# Patient Record
Sex: MALE | Race: WHITE | NOT HISPANIC OR LATINO | Employment: OTHER | URBAN - METROPOLITAN AREA
[De-identification: names, ages, dates, MRNs, and addresses within clinical notes are randomized per-mention and may not be internally consistent; named-entity substitution may affect disease eponyms.]

---

## 2023-08-05 PROBLEM — E78.5 HYPERLIPIDEMIA: Status: ACTIVE | Noted: 2023-08-05

## 2023-08-05 PROBLEM — M25.50 ARTHRALGIA: Status: ACTIVE | Noted: 2023-08-05

## 2023-08-05 PROBLEM — M75.01 ADHESIVE CAPSULITIS OF RIGHT SHOULDER: Status: ACTIVE | Noted: 2023-08-05

## 2023-08-05 PROBLEM — R61 NIGHT SWEATS: Status: ACTIVE | Noted: 2023-08-05

## 2023-08-05 PROBLEM — R79.82 ELEVATED C-REACTIVE PROTEIN (CRP): Status: ACTIVE | Noted: 2023-08-05

## 2023-08-05 PROBLEM — R35.0 URINARY FREQUENCY: Status: ACTIVE | Noted: 2023-08-05

## 2023-08-05 PROBLEM — M75.101 ROTATOR CUFF TEAR ARTHROPATHY OF RIGHT SHOULDER: Status: ACTIVE | Noted: 2023-08-05

## 2023-08-05 PROBLEM — K21.9 GERD (GASTROESOPHAGEAL REFLUX DISEASE): Status: ACTIVE | Noted: 2023-08-05

## 2023-08-05 PROBLEM — S43.439A LABRAL TEAR OF SHOULDER: Status: ACTIVE | Noted: 2023-08-05

## 2023-08-05 PROBLEM — U07.1 COVID-19: Status: ACTIVE | Noted: 2023-08-05

## 2023-08-05 PROBLEM — M79.10 MYALGIA: Status: ACTIVE | Noted: 2023-08-05

## 2023-08-05 PROBLEM — J45.909 ALLERGIC ASTHMA (HHS-HCC): Status: ACTIVE | Noted: 2023-08-05

## 2023-08-05 PROBLEM — R70.0 ELEVATED ERYTHROCYTE SEDIMENTATION RATE: Status: ACTIVE | Noted: 2023-08-05

## 2023-08-05 PROBLEM — H90.3 SENSORINEURAL HEARING LOSS, BILATERAL: Status: ACTIVE | Noted: 2023-08-05

## 2023-08-05 PROBLEM — E06.1 THYROIDITIS, SUBACUTE: Status: ACTIVE | Noted: 2023-08-05

## 2023-08-05 PROBLEM — I10 BENIGN ESSENTIAL HYPERTENSION: Status: ACTIVE | Noted: 2023-08-05

## 2023-08-05 PROBLEM — R63.4 ABNORMAL WEIGHT LOSS: Status: ACTIVE | Noted: 2023-08-05

## 2023-08-05 PROBLEM — E03.9 HYPOTHYROIDISM (ACQUIRED): Status: ACTIVE | Noted: 2023-08-05

## 2023-08-05 PROBLEM — E05.90 HYPERTHYROIDISM: Status: ACTIVE | Noted: 2023-08-05

## 2023-08-05 PROBLEM — K44.9 HIATAL HERNIA: Status: ACTIVE | Noted: 2023-08-05

## 2023-08-05 PROBLEM — J30.1 ALLERGIC RHINITIS DUE TO POLLEN: Status: ACTIVE | Noted: 2023-08-05

## 2023-08-05 PROBLEM — H35.30 MACULAR DEGENERATION: Status: ACTIVE | Noted: 2023-08-05

## 2023-08-05 PROBLEM — R05.9 COUGH: Status: ACTIVE | Noted: 2023-08-05

## 2023-08-05 PROBLEM — M25.511 RIGHT SHOULDER PAIN: Status: ACTIVE | Noted: 2023-08-05

## 2023-08-05 PROBLEM — M77.8 CAPSULITIS OF SHOULDER: Status: ACTIVE | Noted: 2023-08-05

## 2023-08-05 PROBLEM — N52.9 ERECTILE DYSFUNCTION: Status: ACTIVE | Noted: 2023-08-05

## 2023-08-05 PROBLEM — J30.81 ALLERGIC RHINITIS DUE TO CATS: Status: ACTIVE | Noted: 2023-08-05

## 2023-08-05 PROBLEM — M12.811 ROTATOR CUFF TEAR ARTHROPATHY OF RIGHT SHOULDER: Status: ACTIVE | Noted: 2023-08-05

## 2023-08-05 RX ORDER — BUDESONIDE AND FORMOTEROL FUMARATE DIHYDRATE 80; 4.5 UG/1; UG/1
2 AEROSOL RESPIRATORY (INHALATION) 2 TIMES DAILY
COMMUNITY
Start: 2022-06-30 | End: 2024-02-12

## 2023-08-05 RX ORDER — MONTELUKAST SODIUM 10 MG/1
1 TABLET ORAL NIGHTLY
COMMUNITY
Start: 2016-03-18 | End: 2024-01-24 | Stop reason: SDUPTHER

## 2023-08-05 RX ORDER — ALBUTEROL SULFATE 90 UG/1
AEROSOL, METERED RESPIRATORY (INHALATION)
COMMUNITY
Start: 2021-08-06 | End: 2024-01-24 | Stop reason: WASHOUT

## 2023-08-07 PROBLEM — M79.10 MYALGIA: Status: RESOLVED | Noted: 2023-08-05 | Resolved: 2023-08-07

## 2023-08-07 PROBLEM — R05.9 COUGH: Status: RESOLVED | Noted: 2023-08-05 | Resolved: 2023-08-07

## 2023-08-07 PROBLEM — R70.0 ELEVATED ERYTHROCYTE SEDIMENTATION RATE: Status: RESOLVED | Noted: 2023-08-05 | Resolved: 2023-08-07

## 2023-08-07 PROBLEM — U07.1 COVID-19: Status: RESOLVED | Noted: 2023-08-05 | Resolved: 2023-08-07

## 2023-08-07 PROBLEM — R79.82 ELEVATED C-REACTIVE PROTEIN (CRP): Status: RESOLVED | Noted: 2023-08-05 | Resolved: 2023-08-07

## 2023-08-07 PROBLEM — R61 NIGHT SWEATS: Status: RESOLVED | Noted: 2023-08-05 | Resolved: 2023-08-07

## 2023-08-07 PROBLEM — M77.8 CAPSULITIS OF SHOULDER: Status: RESOLVED | Noted: 2023-08-05 | Resolved: 2023-08-07

## 2023-08-07 PROBLEM — M25.50 ARTHRALGIA: Status: RESOLVED | Noted: 2023-08-05 | Resolved: 2023-08-07

## 2023-08-07 NOTE — PROGRESS NOTES
Subjective   Patient ID: Bandar Loving is a 58 y.o. male who presents for Annual Exam and Follow-up.    HPI     The patient presents for his annual wellness exam.   Hx of colonoscopy: 3/24/22 polyps   Hx of prostate cancer screening (men 55-69): due   Immunizations: tdap - declines   History of depression or anxiety: no   Diet: Follows an ok diet  Exercise: Not getting regular exercise     HPL- stopped his statin - just didn't want to take it   No side effects   Will restart if needed     HTN- previously on lisinopril, also stopped it just because he didn't want to take a medication at the time, willing to restart   Not checking BP at home     MRI R shoulder showed adhesive capsulitis- saw Dr. Mckee who referred him to PT - completed it with success ; shoulder much improved     Follows with Dr. 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 Deshpande- uses tadalafil prn     Hx subacute thyroiditis, initially hyperthyroid, then hypothyroid, then euthyroid - tx with prednisone- Dr. Gonsalves     No additional concerns today     Review of Systems   Constitutional:  Negative for chills, fatigue and fever.   HENT:  Negative for congestion, ear pain and sore throat.    Eyes:  Negative for visual disturbance.   Respiratory:  Negative for cough and shortness of breath.    Cardiovascular:  Negative for chest pain, palpitations and leg swelling.   Gastrointestinal:  Negative for abdominal pain, constipation, diarrhea, nausea and vomiting.   Genitourinary: Negative.    Musculoskeletal: Negative.    Skin:  Negative for rash.   Neurological:  Negative for dizziness, weakness, numbness and headaches.   Psychiatric/Behavioral: Negative.         Wt Readings from Last 3 Encounters:   08/08/23 82.6 kg (182 lb 3.2 oz)   01/17/23 85.6 kg (188 lb 12.8 oz)   10/31/22 84.1 kg (185 lb 5 oz)       Objective  "  /90 (BP Location: Left arm, Patient Position: Sitting, BP Cuff Size: Large adult)   Pulse 69   Temp 36.6 °C (97.9 °F) (Temporal)   Resp 16   Ht 1.676 m (5' 6\")   Wt 82.6 kg (182 lb 3.2 oz)   SpO2 98%   BMI 29.41 kg/m²     Physical Exam    Constitutional: Well developed, well nourished, alert and in no acute distress.  Head and Face: Normocephalic, atraumatic.  Eyes: Normal external exam. Pupils equally round and reactive to light with normal accommodation and extraocular movements intact.   ENT: External inspection of ears normal, tympanic membranes visualized and normal. Nasal mucosa, septum, and turbinates normal. Oral mucosa moist, oropharynx clear.   Neck: Supple, no lymphadenopathy or masses. Thyroid not enlarged, no palpable nodules.   Cardiovascular: Regular rate and rhythm, normal S1 and S2, no murmurs, gallops, or rubs. Radial pulses normal. No peripheral edema. No carotid bruits.   Pulmonary: No respiratory distress, lungs clear to auscultation bilaterally. No wheezes, rhonchi, rales.  Abdomen: Soft, nontender, nondistended, normal bowel sounds. No masses palpated.   Musculoskeletal: Gait normal. Muscle strength/tone normal. Normal range of motion of all extremities.   Skin: Warm, well perfused, normal skin turgor and color, no lesions or rashes noted.   Neurologic: Cranial nerves II-XII grossly intact. Sensation normal bilaterally.   Psychiatric: Mood calm and affect normal.      Assessment/Plan   Problem List Items Addressed This Visit       Allergic asthma     Continue singulair, albuterol as needed- sees Dr. Jefferson          Benign essential hypertension     Restart lisinopril 10 mg daily   Goal < 130/80  Nurse visit BP check 1-2 weeks          Relevant Medications    lisinopril 10 mg tablet    Other Relevant Orders    Follow Up In [facility] - PCP - Established    Hyperlipidemia     Recheck labs, consider restarting statin          Relevant Orders    Lipid Panel    Adhesive " capsulitis of right shoulder     Much improved with physical therapy          Thyroiditis, subacute     Recheck TSH, previously treated with prednisone- Dr. Gonsalves          Relevant Orders    TSH with reflex to Free T4 if abnormal    Overweight with body mass index (BMI) of 29 to 29.9 in adult     Other Visit Diagnoses       Annual physical exam    -  Primary    Relevant Orders    CBC and Auto Differential    Comprehensive Metabolic Panel    Hemoglobin A1C    Screening for prostate cancer        Relevant Orders    Prostate Spec.Ag,Screen          Carolann Chaney,   8/8/2023

## 2023-08-08 ENCOUNTER — LAB (OUTPATIENT)
Dept: LAB | Facility: LAB | Age: 58
End: 2023-08-08
Payer: COMMERCIAL

## 2023-08-08 ENCOUNTER — OFFICE VISIT (OUTPATIENT)
Dept: PRIMARY CARE | Facility: CLINIC | Age: 58
End: 2023-08-08
Payer: COMMERCIAL

## 2023-08-08 VITALS
RESPIRATION RATE: 16 BRPM | HEART RATE: 69 BPM | OXYGEN SATURATION: 98 % | DIASTOLIC BLOOD PRESSURE: 90 MMHG | SYSTOLIC BLOOD PRESSURE: 135 MMHG | HEIGHT: 66 IN | TEMPERATURE: 97.9 F | WEIGHT: 182.2 LBS | BODY MASS INDEX: 29.28 KG/M2

## 2023-08-08 DIAGNOSIS — M75.01 ADHESIVE CAPSULITIS OF RIGHT SHOULDER: ICD-10-CM

## 2023-08-08 DIAGNOSIS — Z12.5 SCREENING FOR PROSTATE CANCER: ICD-10-CM

## 2023-08-08 DIAGNOSIS — E06.1 THYROIDITIS, SUBACUTE: ICD-10-CM

## 2023-08-08 DIAGNOSIS — I10 BENIGN ESSENTIAL HYPERTENSION: ICD-10-CM

## 2023-08-08 DIAGNOSIS — J45.909 EXTRINSIC ASTHMA WITHOUT COMPLICATION, UNSPECIFIED ASTHMA SEVERITY, UNSPECIFIED WHETHER PERSISTENT (HHS-HCC): ICD-10-CM

## 2023-08-08 DIAGNOSIS — Z00.00 ANNUAL PHYSICAL EXAM: Primary | ICD-10-CM

## 2023-08-08 DIAGNOSIS — E78.5 HYPERLIPIDEMIA, UNSPECIFIED HYPERLIPIDEMIA TYPE: ICD-10-CM

## 2023-08-08 DIAGNOSIS — E66.3 OVERWEIGHT WITH BODY MASS INDEX (BMI) OF 29 TO 29.9 IN ADULT: ICD-10-CM

## 2023-08-08 DIAGNOSIS — Z00.00 ANNUAL PHYSICAL EXAM: ICD-10-CM

## 2023-08-08 PROBLEM — R35.0 URINARY FREQUENCY: Status: RESOLVED | Noted: 2023-08-05 | Resolved: 2023-08-08

## 2023-08-08 PROBLEM — R63.4 ABNORMAL WEIGHT LOSS: Status: RESOLVED | Noted: 2023-08-05 | Resolved: 2023-08-08

## 2023-08-08 PROCEDURE — 3075F SYST BP GE 130 - 139MM HG: CPT | Performed by: FAMILY MEDICINE

## 2023-08-08 PROCEDURE — 36415 COLL VENOUS BLD VENIPUNCTURE: CPT

## 2023-08-08 PROCEDURE — 80053 COMPREHEN METABOLIC PANEL: CPT

## 2023-08-08 PROCEDURE — 84443 ASSAY THYROID STIM HORMONE: CPT

## 2023-08-08 PROCEDURE — 3080F DIAST BP >= 90 MM HG: CPT | Performed by: FAMILY MEDICINE

## 2023-08-08 PROCEDURE — 83036 HEMOGLOBIN GLYCOSYLATED A1C: CPT

## 2023-08-08 PROCEDURE — 80061 LIPID PANEL: CPT

## 2023-08-08 PROCEDURE — 3008F BODY MASS INDEX DOCD: CPT | Performed by: FAMILY MEDICINE

## 2023-08-08 PROCEDURE — 1036F TOBACCO NON-USER: CPT | Performed by: FAMILY MEDICINE

## 2023-08-08 PROCEDURE — 84153 ASSAY OF PSA TOTAL: CPT

## 2023-08-08 PROCEDURE — 85025 COMPLETE CBC W/AUTO DIFF WBC: CPT

## 2023-08-08 PROCEDURE — 99396 PREV VISIT EST AGE 40-64: CPT | Performed by: FAMILY MEDICINE

## 2023-08-08 RX ORDER — LISINOPRIL 10 MG/1
10 TABLET ORAL DAILY
Qty: 90 TABLET | Refills: 3 | Status: SHIPPED | OUTPATIENT
Start: 2023-08-08 | End: 2024-01-24 | Stop reason: WASHOUT

## 2023-08-08 ASSESSMENT — ENCOUNTER SYMPTOMS
SORE THROAT: 0
PALPITATIONS: 0
MUSCULOSKELETAL NEGATIVE: 1
DIZZINESS: 0
HEADACHES: 0
SHORTNESS OF BREATH: 0
DIARRHEA: 0
NUMBNESS: 0
FEVER: 0
FATIGUE: 0
ABDOMINAL PAIN: 0
PSYCHIATRIC NEGATIVE: 1
VOMITING: 0
NAUSEA: 0
COUGH: 0
CHILLS: 0
WEAKNESS: 0
CONSTIPATION: 0

## 2023-08-08 ASSESSMENT — PATIENT HEALTH QUESTIONNAIRE - PHQ9
2. FEELING DOWN, DEPRESSED OR HOPELESS: NOT AT ALL
SUM OF ALL RESPONSES TO PHQ9 QUESTIONS 1 AND 2: 0
1. LITTLE INTEREST OR PLEASURE IN DOING THINGS: NOT AT ALL

## 2023-08-08 NOTE — PATIENT INSTRUCTIONS
[facility]- [place]     Hypertension (High Blood Pressure)-  Start lisinopril 10 mg daily   It is important to control Blood Pressure for reducing risk of stroke, heart attack, kidney damage, and circulatory problems from clogged arteries.  Advised new blood pressure goals based on evidence from recent studies showed blood pressure should be less than 130/80.   Check your BP at least weekly and If your BP is above this goal on repeated measurements, please contact us so we can make treatment adjustments.  Diet recommendations - reduce sodium intake (less than 2,400 mg/day), follow DASH diet, increase exercise (150 minutes per week), lose weight (Goal BMI < 25).   Generally recommend follow up at least every 6 months.    Recommendations for men annual wellness exam:   Colonoscopy at age 45 or earlier if positive family history of colon cancer   Discuss prostate cancer screening between ages 55-69 or sooner if family history of prostate cancer   One time screen for abdominal aortic aneurysm for men ages 65-75 who have ever smoked  Screening for lung cancer with low-dose CT in all adults age 50-80 who have a 20 pack-year smoking history and currently smoke or have quit within the past 15 years  Follow a healthy diet (Dash diet, Mediterranean diet)  Exercise 150 min/wk  Maintain healthy weight (BMI < 25)  Do not smoke   Alcohol in moderation (up to 2 drinks/day)   Get enough sleep (7-8 hours/night)  Make sure immunizations are up to date (influenza, Tdap, shingles if age > 50)  Visit dentist twice yearly      Hyperlipidemia-   You have high cholesterol. (hyperlipidemia). This increases your risk for cardiovascular disease.(Heart attack/stroke).  It is recommended that the patient limit refined carbohydrates such as breads, cereals, alcohol, pasta, pretzels, and sugar- sweetened foods and beverages.  Eat lots of vegetables, lean proteins such as eggs, fish, chicken, and fruit.   Eat small amounts of healthy  fat each day -  a handful of walnuts, an avocado, olive oil on your salad, and fatty fishes such as salmon. Exercise should be included as you are able, with a goal of 30 minutes 4-6 days per week    Continue any prescribed and advised OTC medications, in addition, as reminder:  *For high LDL (>130) use blueberries 1 cup daily  *Psyllium capsules or powder 2 times daily, such as Airec or Metamucil.   *Consider also adding plant stanols and sterols such as Nature Made CholestOff, available over the counter.     *For high triglycerides- start fish oil 2,000 - 4,000 mg daily.

## 2023-08-09 LAB
ALANINE AMINOTRANSFERASE (SGPT) (U/L) IN SER/PLAS: 19 U/L (ref 10–52)
ALBUMIN (G/DL) IN SER/PLAS: 4.9 G/DL (ref 3.4–5)
ALKALINE PHOSPHATASE (U/L) IN SER/PLAS: 39 U/L (ref 33–136)
ANION GAP IN SER/PLAS: 12 MMOL/L (ref 10–20)
ASPARTATE AMINOTRANSFERASE (SGOT) (U/L) IN SER/PLAS: 19 U/L (ref 9–39)
BASOPHILS (10*3/UL) IN BLOOD BY AUTOMATED COUNT: 0.05 X10E9/L (ref 0–0.1)
BASOPHILS/100 LEUKOCYTES IN BLOOD BY AUTOMATED COUNT: 1 % (ref 0–2)
BILIRUBIN TOTAL (MG/DL) IN SER/PLAS: 1 MG/DL (ref 0–1.2)
CALCIUM (MG/DL) IN SER/PLAS: 9.8 MG/DL (ref 8.6–10.6)
CARBON DIOXIDE, TOTAL (MMOL/L) IN SER/PLAS: 30 MMOL/L (ref 21–32)
CHLORIDE (MMOL/L) IN SER/PLAS: 102 MMOL/L (ref 98–107)
CHOLESTEROL (MG/DL) IN SER/PLAS: 232 MG/DL (ref 0–199)
CHOLESTEROL IN HDL (MG/DL) IN SER/PLAS: 62.2 MG/DL
CHOLESTEROL/HDL RATIO: 3.7
CREATININE (MG/DL) IN SER/PLAS: 1.23 MG/DL (ref 0.5–1.3)
EOSINOPHILS (10*3/UL) IN BLOOD BY AUTOMATED COUNT: 0.31 X10E9/L (ref 0–0.7)
EOSINOPHILS/100 LEUKOCYTES IN BLOOD BY AUTOMATED COUNT: 6.5 % (ref 0–6)
ERYTHROCYTE DISTRIBUTION WIDTH (RATIO) BY AUTOMATED COUNT: 12.2 % (ref 11.5–14.5)
ERYTHROCYTE MEAN CORPUSCULAR HEMOGLOBIN CONCENTRATION (G/DL) BY AUTOMATED: 32.6 G/DL (ref 32–36)
ERYTHROCYTE MEAN CORPUSCULAR VOLUME (FL) BY AUTOMATED COUNT: 95 FL (ref 80–100)
ERYTHROCYTES (10*6/UL) IN BLOOD BY AUTOMATED COUNT: 5.07 X10E12/L (ref 4.5–5.9)
ESTIMATED AVERAGE GLUCOSE FOR HBA1C: 91 MG/DL
GFR MALE: 67 ML/MIN/1.73M2
GLUCOSE (MG/DL) IN SER/PLAS: 93 MG/DL (ref 74–99)
HEMATOCRIT (%) IN BLOOD BY AUTOMATED COUNT: 48.1 % (ref 41–52)
HEMOGLOBIN (G/DL) IN BLOOD: 15.7 G/DL (ref 13.5–17.5)
HEMOGLOBIN A1C/HEMOGLOBIN TOTAL IN BLOOD: 4.8 %
IMMATURE GRANULOCYTES/100 LEUKOCYTES IN BLOOD BY AUTOMATED COUNT: 0.2 % (ref 0–0.9)
LDL: 146 MG/DL (ref 0–99)
LEUKOCYTES (10*3/UL) IN BLOOD BY AUTOMATED COUNT: 4.8 X10E9/L (ref 4.4–11.3)
LYMPHOCYTES (10*3/UL) IN BLOOD BY AUTOMATED COUNT: 1.3 X10E9/L (ref 1.2–4.8)
LYMPHOCYTES/100 LEUKOCYTES IN BLOOD BY AUTOMATED COUNT: 27.1 % (ref 13–44)
MONOCYTES (10*3/UL) IN BLOOD BY AUTOMATED COUNT: 0.5 X10E9/L (ref 0.1–1)
MONOCYTES/100 LEUKOCYTES IN BLOOD BY AUTOMATED COUNT: 10.4 % (ref 2–10)
NEUTROPHILS (10*3/UL) IN BLOOD BY AUTOMATED COUNT: 2.63 X10E9/L (ref 1.2–7.7)
NEUTROPHILS/100 LEUKOCYTES IN BLOOD BY AUTOMATED COUNT: 54.8 % (ref 40–80)
NRBC (PER 100 WBCS) BY AUTOMATED COUNT: 0 /100 WBC (ref 0–0)
PLATELETS (10*3/UL) IN BLOOD AUTOMATED COUNT: 172 X10E9/L (ref 150–450)
POTASSIUM (MMOL/L) IN SER/PLAS: 4.2 MMOL/L (ref 3.5–5.3)
PROSTATE SPECIFIC ANTIGEN,SCREEN: 0.34 NG/ML (ref 0–4)
PROTEIN TOTAL: 7.3 G/DL (ref 6.4–8.2)
SODIUM (MMOL/L) IN SER/PLAS: 140 MMOL/L (ref 136–145)
THYROTROPIN (MIU/L) IN SER/PLAS BY DETECTION LIMIT <= 0.05 MIU/L: 2.43 MIU/L (ref 0.44–3.98)
TRIGLYCERIDE (MG/DL) IN SER/PLAS: 117 MG/DL (ref 0–149)
UREA NITROGEN (MG/DL) IN SER/PLAS: 12 MG/DL (ref 6–23)
VLDL: 23 MG/DL (ref 0–40)

## 2023-08-24 ENCOUNTER — CLINICAL SUPPORT (OUTPATIENT)
Dept: PRIMARY CARE | Facility: CLINIC | Age: 58
End: 2023-08-24
Payer: COMMERCIAL

## 2023-08-24 VITALS — HEART RATE: 75 BPM | SYSTOLIC BLOOD PRESSURE: 130 MMHG | DIASTOLIC BLOOD PRESSURE: 79 MMHG

## 2023-08-24 DIAGNOSIS — I10 BENIGN ESSENTIAL HYPERTENSION: ICD-10-CM

## 2023-09-11 LAB
CHLAMYDIA TRACH., AMPLIFIED: NEGATIVE
N. GONORRHEA, AMPLIFIED: NEGATIVE
TRICHOMONAS VAGINALIS: NEGATIVE

## 2023-11-09 PROBLEM — H35.711 CENTRAL SEROUS CHORIORETINOPATHY OF RIGHT EYE: Status: ACTIVE | Noted: 2023-11-09

## 2024-01-24 ENCOUNTER — OFFICE VISIT (OUTPATIENT)
Dept: ALLERGY | Facility: CLINIC | Age: 59
End: 2024-01-24
Payer: COMMERCIAL

## 2024-01-24 VITALS — WEIGHT: 180 LBS | OXYGEN SATURATION: 95 % | HEART RATE: 70 BPM | BODY MASS INDEX: 29.05 KG/M2

## 2024-01-24 DIAGNOSIS — J45.909 EXTRINSIC ASTHMA WITHOUT COMPLICATION, UNSPECIFIED ASTHMA SEVERITY, UNSPECIFIED WHETHER PERSISTENT (HHS-HCC): Primary | ICD-10-CM

## 2024-01-24 DIAGNOSIS — R21 RASH: ICD-10-CM

## 2024-01-24 DIAGNOSIS — J30.81 ALLERGIC RHINITIS DUE TO CATS: ICD-10-CM

## 2024-01-24 LAB
FEV1/FVC: 98 %
FEV1: 79 LITERS
FVC: 80 LITERS

## 2024-01-24 PROCEDURE — 96160 PT-FOCUSED HLTH RISK ASSMT: CPT | Performed by: ALLERGY & IMMUNOLOGY

## 2024-01-24 PROCEDURE — 1036F TOBACCO NON-USER: CPT | Performed by: ALLERGY & IMMUNOLOGY

## 2024-01-24 PROCEDURE — 3008F BODY MASS INDEX DOCD: CPT | Performed by: ALLERGY & IMMUNOLOGY

## 2024-01-24 PROCEDURE — 99214 OFFICE O/P EST MOD 30 MIN: CPT | Performed by: ALLERGY & IMMUNOLOGY

## 2024-01-24 PROCEDURE — 94060 EVALUATION OF WHEEZING: CPT | Performed by: ALLERGY & IMMUNOLOGY

## 2024-01-24 RX ORDER — TRIAMCINOLONE ACETONIDE 1 MG/G
CREAM TOPICAL
COMMUNITY
Start: 2016-09-22 | End: 2024-01-24 | Stop reason: SDUPTHER

## 2024-01-24 RX ORDER — MONTELUKAST SODIUM 10 MG/1
10 TABLET ORAL NIGHTLY
Qty: 90 TABLET | Refills: 3 | Status: SHIPPED | OUTPATIENT
Start: 2024-01-24 | End: 2025-01-23

## 2024-01-24 RX ORDER — TRIAMCINOLONE ACETONIDE 1 MG/G
CREAM TOPICAL 2 TIMES DAILY PRN
Qty: 80 G | Refills: 0 | Status: SHIPPED | OUTPATIENT
Start: 2024-01-24 | End: 2024-02-12

## 2024-01-24 RX ORDER — KETOCONAZOLE 20 MG/G
CREAM TOPICAL DAILY
COMMUNITY
Start: 2019-04-10

## 2024-01-24 NOTE — PATIENT INSTRUCTIONS
Continue montelukast every night.    Continue Symbicort 2 inhalations every 4 hours as needed.    Apply triamcinolone cream to affected areas as needed.

## 2024-01-24 NOTE — PROGRESS NOTES
Subjective   Patient ID:   [id number]   Bandar Loving is a 59 y.o. male who presents for Asthma.    Chief Complaint   Patient presents with    Asthma      Since last visit, 08-, patient reports he is having his usual wintertime asthma flare.  He continues montelukast intermittently, sometimes 4 in a week, sometimes none.  He does not feel he needs it every day other than during winter.  He does not use his albuterol at all.  He carries his Symbicort and uses it about 3 times a week, 2 inhalations.  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    Patient also complains of intermittent breakouts and requests a refill of his triamcinolone cream.      Objective     Pulse 70   Wt 81.6 kg (180 lb)   SpO2 95%   BMI 29.05 kg/m²      Physical Exam  Constitutional:       Appearance: Normal appearance.   HENT:      Head: Normocephalic and atraumatic.      Right Ear: External ear normal. There is no impacted cerumen.      Left Ear: External ear normal. There is no impacted cerumen.      Nose: No congestion or rhinorrhea.   Eyes:      Extraocular Movements: Extraocular movements intact.      Conjunctiva/sclera: Conjunctivae normal.      Pupils: Pupils are equal, round, and reactive to light.   Cardiovascular:      Rate and Rhythm: Normal rate and regular rhythm.      Heart sounds: No murmur heard.     No friction rub. No gallop.   Pulmonary:      Effort: No respiratory distress.      Breath sounds: No wheezing, rhonchi or rales.   Skin:     General: Skin is warm and dry.   Neurological:      Mental Status: He is alert.   Psychiatric:         Mood and Affect: Mood normal.         Behavior: Behavior normal.       Pulmonary Functions Testing Results:    FEV1   Date Value Ref Range Status   01/24/2024 79 liters Final     FVC   Date Value Ref Range Status   01/24/2024 80 liters Final     FEV1/FVC   Date Value Ref Range Status   01/24/2024 98 % Final       Assessment/Plan          Allergic asthma  ACT is 18.  IO pre and post PFT showed increased reversibility indicating presence of inflammation.  Lung age went from 76 y.o. to 63yo.    I would like him to start montelukast every night during the winter months. He tends to have exacerbations in the winter due to increased exposure.     Continue Symbicort 2 inhalations every 4 hours as needed.         Allergic rhinitis due to cats  He will continue triamcinolone as needed      By signing my name below, I, Jenny Saravia, attest that this documentation has been prepared under the direction and in the presence of Nicky Jefferson MD.  All medical record entries made by the Scribe were at my direction and personally dictated by me. I have reviewed the chart and agree that the record accurately reflects my personal performance of the history, physical exam, discussion and plan.

## 2024-01-24 NOTE — ASSESSMENT & PLAN NOTE
ACT is 18.  IO pre and post PFT showed increased reversibility indicating presence of inflammation.  Lung age went from 76 y.o. to 63yo.    I would like him to start montelukast every night during the winter months. He tends to have exacerbations in the winter due to increased exposure.     Continue Symbicort 2 inhalations every 4 hours as needed.

## 2024-02-08 ENCOUNTER — OFFICE VISIT (OUTPATIENT)
Dept: PRIMARY CARE | Facility: CLINIC | Age: 59
End: 2024-02-08
Payer: COMMERCIAL

## 2024-02-08 VITALS
DIASTOLIC BLOOD PRESSURE: 82 MMHG | TEMPERATURE: 97.8 F | SYSTOLIC BLOOD PRESSURE: 134 MMHG | RESPIRATION RATE: 16 BRPM | OXYGEN SATURATION: 97 % | WEIGHT: 185.7 LBS | HEART RATE: 66 BPM | BODY MASS INDEX: 29.97 KG/M2

## 2024-02-08 DIAGNOSIS — E06.1 THYROIDITIS, SUBACUTE: ICD-10-CM

## 2024-02-08 DIAGNOSIS — I10 BENIGN ESSENTIAL HYPERTENSION: ICD-10-CM

## 2024-02-08 DIAGNOSIS — E78.5 HYPERLIPIDEMIA, UNSPECIFIED HYPERLIPIDEMIA TYPE: Primary | ICD-10-CM

## 2024-02-08 DIAGNOSIS — R93.1 AGATSTON CORONARY ARTERY CALCIUM SCORE BETWEEN 100 AND 400: ICD-10-CM

## 2024-02-08 DIAGNOSIS — E66.3 OVERWEIGHT WITH BODY MASS INDEX (BMI) OF 29 TO 29.9 IN ADULT: ICD-10-CM

## 2024-02-08 DIAGNOSIS — Z13.6 ENCOUNTER FOR SCREENING FOR CARDIOVASCULAR DISORDERS: ICD-10-CM

## 2024-02-08 PROCEDURE — 3075F SYST BP GE 130 - 139MM HG: CPT | Performed by: FAMILY MEDICINE

## 2024-02-08 PROCEDURE — 3008F BODY MASS INDEX DOCD: CPT | Performed by: FAMILY MEDICINE

## 2024-02-08 PROCEDURE — 3079F DIAST BP 80-89 MM HG: CPT | Performed by: FAMILY MEDICINE

## 2024-02-08 PROCEDURE — 99214 OFFICE O/P EST MOD 30 MIN: CPT | Performed by: FAMILY MEDICINE

## 2024-02-08 PROCEDURE — 1036F TOBACCO NON-USER: CPT | Performed by: FAMILY MEDICINE

## 2024-02-08 RX ORDER — ASPIRIN 81 MG/1
81 TABLET ORAL DAILY
COMMUNITY

## 2024-02-08 RX ORDER — ROSUVASTATIN CALCIUM 10 MG/1
10 TABLET, COATED ORAL DAILY
Qty: 90 TABLET | Refills: 3 | Status: SHIPPED | OUTPATIENT
Start: 2024-02-08 | End: 2025-02-07

## 2024-02-08 RX ORDER — LISINOPRIL 10 MG/1
10 TABLET ORAL DAILY
Qty: 90 TABLET | Refills: 3 | Status: SHIPPED | OUTPATIENT
Start: 2024-02-08 | End: 2025-02-07

## 2024-02-08 RX ORDER — ATENOLOL 50 MG/1
50 TABLET ORAL
COMMUNITY
Start: 2022-02-28 | End: 2024-02-08 | Stop reason: ALTCHOICE

## 2024-02-08 ASSESSMENT — ENCOUNTER SYMPTOMS
SHORTNESS OF BREATH: 0
FATIGUE: 0
CHILLS: 0
FEVER: 0
COUGH: 0
PALPITATIONS: 0

## 2024-02-08 NOTE — PROGRESS NOTES
Subjective   Patient ID: Bandar Loving is a 59 y.o. male who presents for Follow-up, Hypertension, Hypothyroidism, and Hyperlipidemia.    HPI     Here for follow up-       HPL- stopped his statin - just didn't want to take it   No side effects   Will restart if needed   CT calcium score 163 in 2019   Nuclear stress neg 2022   He denies chest pain, palpitations, dyspnea      HTN- he hasn't been taking his BP medication consistently, restarted it about a week ago  Does not check BP at home      Follows with Dr. 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 Deshpande- uses tadalafil prn      Hx subacute thyroiditis, initially hyperthyroid, then hypothyroid, then euthyroid - tx with prednisone- Dr. Gonsalves - not on meds now        Current Outpatient Medications   Medication Sig Dispense Refill    ketoconazole (NIZOral) 2 % cream Apply topically once daily.      montelukast (Singulair) 10 mg tablet Take 1 tablet (10 mg) by mouth once daily at bedtime. 90 tablet 3    Symbicort 80-4.5 mcg/actuation inhaler Inhale 2 puffs 2 times a day.      triamcinolone (Kenalog) 0.1 % cream Apply topically 2 times a day as needed for rash or irritation. 80 g 0    aspirin 81 mg EC tablet Take 1 tablet (81 mg) by mouth once daily.      lisinopril 10 mg tablet Take 1 tablet (10 mg) by mouth once daily. 90 tablet 3    rosuvastatin (Crestor) 10 mg tablet Take 1 tablet (10 mg) by mouth once daily. 90 tablet 3     No current facility-administered medications for this visit.       Review of Systems   Constitutional:  Negative for chills, fatigue and fever.   Respiratory:  Negative for cough and shortness of breath.    Cardiovascular:  Negative for chest pain and palpitations.         Objective   /82   Pulse 66   Temp 36.6 °C (97.8 °F) (Temporal)   Resp 16   Wt 84.2 kg (185 lb 11.2 oz)   SpO2 97%   BMI 29.97 kg/m²     Physical  Exam    Constitutional: Well developed, well nourished, alert and in no acute distress   Eyes: Normal external exam. Pupils equally round and reactive to light with normal accommodation and extraocular movements intact.  Neck: Supple, no lymphadenopathy or masses.   Cardiovascular: Regular rate and rhythm, normal S1 and S2, no murmurs, gallops, or rubs. Radial pulses normal. No peripheral edema.  Pulmonary: No respiratory distress, lungs clear to auscultation bilaterally. No wheezes, rhonchi, rales.  Skin: Warm, well perfused, normal skin turgor and color.   Neurologic: Cranial nerves II-XII grossly intact.   Psychiatric: Mood calm and affect normal.      Assessment/Plan   Problem List Items Addressed This Visit             ICD-10-CM    Benign essential hypertension I10     Restart lisinopril 10 mg daily   Goal < 130/80  Nurse visit BP check 1-2 weeks          Relevant Medications    lisinopril 10 mg tablet    Other Relevant Orders    Follow Up In [facility] - PCP - Established    Hyperlipidemia - Primary E78.5     Restart statin   Check labs 3 months          Relevant Medications    rosuvastatin (Crestor) 10 mg tablet    Other Relevant Orders    Lipid Panel    Comprehensive Metabolic Panel    Thyroiditis, subacute E06.1     History of, previously treated with prednisone- Dr. Gonsalves           Overweight with body mass index (BMI) of 29 to 29.9 in adult E66.3, Z68.29    Agatston coronary artery calcium score between 100 and 400 R93.1     Start aspirin 81 mg daily  Start statin  Repeat CT calcium score summer 2024   Follow up with cardiology          Relevant Orders    Referral to Cardiology     Other Visit Diagnoses         Codes    Encounter for screening for cardiovascular disorders     Z13.6    Relevant Orders    CT cardiac scoring wo IV contrast          Follow up 6 months   Carolann Chaney DO  2/8/2024

## 2024-02-08 NOTE — ASSESSMENT & PLAN NOTE
Start aspirin 81 mg daily  Start statin  Repeat CT calcium score summer 2024   Follow up with cardiology

## 2024-02-11 DIAGNOSIS — R21 RASH: ICD-10-CM

## 2024-02-12 RX ORDER — TRIAMCINOLONE ACETONIDE 1 MG/G
CREAM TOPICAL
Qty: 80 G | Refills: 1 | Status: SHIPPED | OUTPATIENT
Start: 2024-02-12

## 2024-02-12 RX ORDER — BUDESONIDE AND FORMOTEROL FUMARATE DIHYDRATE 80; 4.5 UG/1; UG/1
2 AEROSOL RESPIRATORY (INHALATION) 2 TIMES DAILY
Qty: 30.6 G | Refills: 3 | Status: SHIPPED | OUTPATIENT
Start: 2024-02-12

## 2024-02-22 PROBLEM — M17.9 OSTEOARTHRITIS OF KNEE: Status: ACTIVE | Noted: 2024-02-22

## 2024-02-22 PROBLEM — R06.02 SHORTNESS OF BREATH: Status: ACTIVE | Noted: 2024-02-22

## 2024-02-22 PROBLEM — B35.9 DERMATOPHYTOSIS: Status: ACTIVE | Noted: 2024-02-22

## 2024-02-22 PROBLEM — J32.9 SINUSITIS: Status: ACTIVE | Noted: 2024-02-22

## 2024-02-22 PROBLEM — R07.9 CHEST PAIN: Status: ACTIVE | Noted: 2024-02-22

## 2024-02-22 PROBLEM — R53.81 MALAISE AND FATIGUE: Status: ACTIVE | Noted: 2024-02-22

## 2024-02-22 PROBLEM — R91.8 LUNG MASS: Status: ACTIVE | Noted: 2024-02-22

## 2024-02-22 PROBLEM — R53.83 MALAISE AND FATIGUE: Status: ACTIVE | Noted: 2024-02-22

## 2024-02-22 PROBLEM — J02.9 PHARYNGITIS: Status: ACTIVE | Noted: 2024-02-22

## 2024-02-22 PROBLEM — D18.01 HEMANGIOMA OF SKIN AND SUBCUTANEOUS TISSUE: Status: ACTIVE | Noted: 2019-04-09

## 2024-02-22 PROBLEM — D50.9 IRON DEFICIENCY ANEMIA: Status: ACTIVE | Noted: 2024-02-22

## 2024-02-22 PROBLEM — R10.9 ABDOMINAL PAIN: Status: ACTIVE | Noted: 2024-02-22

## 2024-02-27 ENCOUNTER — OFFICE VISIT (OUTPATIENT)
Dept: CARDIOLOGY | Facility: CLINIC | Age: 59
End: 2024-02-27
Payer: COMMERCIAL

## 2024-02-27 ENCOUNTER — LAB (OUTPATIENT)
Dept: LAB | Facility: LAB | Age: 59
End: 2024-02-27
Payer: COMMERCIAL

## 2024-02-27 VITALS
HEIGHT: 66 IN | HEART RATE: 71 BPM | WEIGHT: 181 LBS | DIASTOLIC BLOOD PRESSURE: 92 MMHG | BODY MASS INDEX: 29.09 KG/M2 | OXYGEN SATURATION: 96 % | SYSTOLIC BLOOD PRESSURE: 131 MMHG

## 2024-02-27 DIAGNOSIS — R93.1 AGATSTON CORONARY ARTERY CALCIUM SCORE BETWEEN 100 AND 400: Primary | ICD-10-CM

## 2024-02-27 DIAGNOSIS — E78.5 HYPERLIPIDEMIA, UNSPECIFIED HYPERLIPIDEMIA TYPE: ICD-10-CM

## 2024-02-27 DIAGNOSIS — I10 BENIGN ESSENTIAL HYPERTENSION: ICD-10-CM

## 2024-02-27 LAB
ALBUMIN SERPL BCP-MCNC: 4.9 G/DL (ref 3.4–5)
ALP SERPL-CCNC: 44 U/L (ref 33–136)
ALT SERPL W P-5'-P-CCNC: 27 U/L (ref 10–52)
ANION GAP SERPL CALC-SCNC: 14 MMOL/L (ref 10–20)
AST SERPL W P-5'-P-CCNC: 22 U/L (ref 9–39)
BILIRUB SERPL-MCNC: 0.8 MG/DL (ref 0–1.2)
BUN SERPL-MCNC: 16 MG/DL (ref 6–23)
CALCIUM SERPL-MCNC: 9.9 MG/DL (ref 8.6–10.6)
CHLORIDE SERPL-SCNC: 103 MMOL/L (ref 98–107)
CHOLEST SERPL-MCNC: 188 MG/DL (ref 0–199)
CHOLESTEROL/HDL RATIO: 3.1
CO2 SERPL-SCNC: 29 MMOL/L (ref 21–32)
CREAT SERPL-MCNC: 1.29 MG/DL (ref 0.5–1.3)
EGFRCR SERPLBLD CKD-EPI 2021: 63 ML/MIN/1.73M*2
GLUCOSE SERPL-MCNC: 87 MG/DL (ref 74–99)
HDLC SERPL-MCNC: 60.3 MG/DL
LDLC SERPL CALC-MCNC: 112 MG/DL
NON HDL CHOLESTEROL: 128 MG/DL (ref 0–149)
POTASSIUM SERPL-SCNC: 4.5 MMOL/L (ref 3.5–5.3)
PROT SERPL-MCNC: 7.6 G/DL (ref 6.4–8.2)
SODIUM SERPL-SCNC: 141 MMOL/L (ref 136–145)
TRIGL SERPL-MCNC: 79 MG/DL (ref 0–149)
VLDL: 16 MG/DL (ref 0–40)

## 2024-02-27 PROCEDURE — 3080F DIAST BP >= 90 MM HG: CPT | Performed by: STUDENT IN AN ORGANIZED HEALTH CARE EDUCATION/TRAINING PROGRAM

## 2024-02-27 PROCEDURE — 99203 OFFICE O/P NEW LOW 30 MIN: CPT | Performed by: STUDENT IN AN ORGANIZED HEALTH CARE EDUCATION/TRAINING PROGRAM

## 2024-02-27 PROCEDURE — 99213 OFFICE O/P EST LOW 20 MIN: CPT | Mod: 25 | Performed by: STUDENT IN AN ORGANIZED HEALTH CARE EDUCATION/TRAINING PROGRAM

## 2024-02-27 PROCEDURE — 3008F BODY MASS INDEX DOCD: CPT | Performed by: STUDENT IN AN ORGANIZED HEALTH CARE EDUCATION/TRAINING PROGRAM

## 2024-02-27 PROCEDURE — 3075F SYST BP GE 130 - 139MM HG: CPT | Performed by: STUDENT IN AN ORGANIZED HEALTH CARE EDUCATION/TRAINING PROGRAM

## 2024-02-27 PROCEDURE — 93005 ELECTROCARDIOGRAM TRACING: CPT | Performed by: STUDENT IN AN ORGANIZED HEALTH CARE EDUCATION/TRAINING PROGRAM

## 2024-02-27 PROCEDURE — 1036F TOBACCO NON-USER: CPT | Performed by: STUDENT IN AN ORGANIZED HEALTH CARE EDUCATION/TRAINING PROGRAM

## 2024-02-27 PROCEDURE — 93010 ELECTROCARDIOGRAM REPORT: CPT | Performed by: STUDENT IN AN ORGANIZED HEALTH CARE EDUCATION/TRAINING PROGRAM

## 2024-02-27 PROCEDURE — 36415 COLL VENOUS BLD VENIPUNCTURE: CPT

## 2024-02-27 PROCEDURE — 80061 LIPID PANEL: CPT

## 2024-02-27 PROCEDURE — 80053 COMPREHEN METABOLIC PANEL: CPT

## 2024-02-27 NOTE — PATIENT INSTRUCTIONS
Follow-up in 1 year  Continue current medication regimen as prescribed  Continue heart healthy plant-based diet and exercise

## 2024-02-27 NOTE — PROGRESS NOTES
Referred by Dr. Chaney for Coronary Artery Disease     History Of Present Illness:    Bandar Loving is a 59 y.o. male past patient for GERD, hiatal hernia, hyperlipidemia, hypertension and CAD based on calcium score 160 who presents to clinic for evaluation.  In 2019 patient had a calcium score which returned at 163.32.  Patient's been asymptomatic.    6/20/2022 patient exercise stress test which examined exercised for 9 minutes achieving 13 METS and 102% of age-predicted maximal heart rate.  There is no evidence of ischemia by EKG or echocardiogram.  Ejection fraction.  55 to 60% only to 70 to 75%.  4/14/2022 patient underwent nuclear stress test at which time he was able to achieve 10.8 METS and 94% of age-predicted maximal heart rate.  There is no previous ischemia by EKG or perfusion.  Patient has been asymptomatic since that time.  He does have GERD symptoms and a chronic hiatal hernia.  Most recent , triglycerides 117 total cholesterol 232 and HDL 62.  Patient is on rosuvastatin 10 mg daily and aspirin 81 mg daily.  Patient does have family history of heart disease involving both grandfathers and father.  Denies tobacco or heavy ethanol consumption.    Baseline EKG is normal sinus rhythm normal axis and poor R wave progression.      Past Medical History:  He has a past medical history of Acute prostatitis (06/29/2015), Disorder of pigmentation, unspecified (06/04/2019), Epigastric pain (05/02/2019), History of colonoscopy (03/24/2022), Pain in right shoulder (05/27/2022), Personal history of other diseases of the digestive system (03/25/2019), Personal history of other diseases of the digestive system (05/02/2019), Personal history of other diseases of the respiratory system, Personal history of other infectious and parasitic diseases (11/30/2021), Personal history of other specified conditions (04/13/2022), Personal history of other specified conditions (02/15/2022), Solitary pulmonary nodule  "(02/23/2021), and Unspecified asthma, uncomplicated (03/18/2016).    Past Surgical History:  He has a past surgical history that includes Tonsillectomy (09/29/2014); Other surgical history (03/25/2019); MR angio head wo IV contrast (3/4/2015); and MR angio neck wo IV contrast (3/4/2015).      Social History:  He reports that he has never smoked. He has never been exposed to tobacco smoke. He has never used smokeless tobacco. He reports current alcohol use. He reports that he does not use drugs.    Family History:  Family History   Problem Relation Name Age of Onset    Alzheimer's disease Mother      Heart disease Father      Heart failure Father      Heart attack Maternal Grandfather      Stroke Paternal Grandfather          Allergies:  Patient has no known allergies.    Outpatient Medications:  Current Outpatient Medications   Medication Instructions    aspirin 81 mg, oral, Daily    budesonide-formoteroL (Symbicort) 80-4.5 mcg/actuation inhaler 2 puffs, inhalation, 2 times daily, RINSE MOUTH AFTER USE    ketoconazole (NIZOral) 2 % cream Topical, Daily    lisinopril 10 mg, oral, Daily    montelukast (SINGULAIR) 10 mg, oral, Nightly    rosuvastatin (CRESTOR) 10 mg, oral, Daily    triamcinolone (Kenalog) 0.1 % cream APPLY TOPICALLY TWICE A DAY AS NEEDED FOR RASH OR IRRITATION        Last Recorded Vitals:  Vitals:    02/27/24 0901   BP: (!) 131/92   BP Location: Left arm   Patient Position: Sitting   BP Cuff Size: Adult   Pulse: 71   SpO2: 96%   Weight: 82.1 kg (181 lb)   Height: 1.676 m (5' 6\")       Physical Exam:  General: No acute distress,  A&O x3  Skin: Warm and dry  Neck: JVD is not elevated  ENT: Moist mucous membranes no lesions appreciated  Pulmonary: CTAB  Cards: Regular rate rhythm, no murmurs gallops or rubs appreciated normal S1-S2  Abdomen: Soft nontender nondistended  Extremities: No edema or cyanosis  Psych: Appropriate mood and affect     Last Labs:  CBC -  Lab Results   Component Value Date    WBC " 4.8 08/08/2023    HGB 15.7 08/08/2023    HCT 48.1 08/08/2023    MCV 95 08/08/2023     08/08/2023       CMP -  Lab Results   Component Value Date    CALCIUM 9.8 08/08/2023    PROT 7.3 08/08/2023    ALBUMIN 4.9 08/08/2023    AST 19 08/08/2023    ALT 19 08/08/2023    ALKPHOS 39 08/08/2023    BILITOT 1.0 08/08/2023       LIPID PANEL -   Lab Results   Component Value Date    CHOL 232 (H) 08/08/2023    TRIG 117 08/08/2023    HDL 62.2 08/08/2023    CHHDL 3.7 08/08/2023    LDLF 146 (H) 08/08/2023    VLDL 23 08/08/2023    NHDL 184 11/30/2021       RENAL FUNCTION PANEL -   Lab Results   Component Value Date    GLUCOSE 93 08/08/2023     08/08/2023    K 4.2 08/08/2023     08/08/2023    CO2 30 08/08/2023    ANIONGAP 12 08/08/2023    BUN 12 08/08/2023    CREATININE 1.23 08/08/2023    GFRMALE 67 08/08/2023    CALCIUM 9.8 08/08/2023    ALBUMIN 4.9 08/08/2023        Lab Results   Component Value Date    HGBA1C 4.8 08/08/2023       Last Cardiology Tests:  ECG:  No results found for this or any previous visit from the past 1095 days.    Assessment/Plan     1.  Subclinical CAD: Calcium score 163 in 2019.  Asymptomatic.  Baseline EKG of normal sinus rhythm.  Previous .  Prior functional evaluations in 2020 and 2022 appropriate functional capacity no evidence of ischemia by EKG echo or perfusion.  Patient has a repeat calcium score pending.  Agree with current medication management.  Continue to emphasize heart healthy plant-based diet, exercise and stress reduction.    2.  Hypertension: Blood pressures are fairly controlled current dose of lisinopril.    3.  Hyperlipidemia:  patient is now on rosuvastatin 10 mg daily.  Advised compliance.  Repeat lipid panel as per PCP.    (This note was generated with voice recognition software and may contain errors including spelling, grammar, syntax and missed recognition of what was dictated, of which may not have been fully corrected)     Candido King MD  PhD

## 2024-02-29 LAB
ATRIAL RATE: 68 BPM
P AXIS: 59 DEGREES
P OFFSET: 201 MS
P ONSET: 140 MS
PR INTERVAL: 168 MS
Q ONSET: 224 MS
QRS COUNT: 12 BEATS
QRS DURATION: 88 MS
QT INTERVAL: 398 MS
QTC CALCULATION(BAZETT): 423 MS
QTC FREDERICIA: 415 MS
R AXIS: 32 DEGREES
T AXIS: 41 DEGREES
T OFFSET: 423 MS
VENTRICULAR RATE: 68 BPM

## 2024-07-16 ENCOUNTER — HOSPITAL ENCOUNTER (OUTPATIENT)
Dept: RADIOLOGY | Facility: CLINIC | Age: 59
Discharge: HOME | End: 2024-07-16
Payer: COMMERCIAL

## 2024-07-16 DIAGNOSIS — Z13.6 ENCOUNTER FOR SCREENING FOR CARDIOVASCULAR DISORDERS: ICD-10-CM

## 2024-07-16 PROCEDURE — 75571 CT HRT W/O DYE W/CA TEST: CPT

## 2024-08-07 NOTE — PROGRESS NOTES
Subjective   Patient ID: Bandar Loving is a 59 y.o. male who presents for Annual Exam (Pt presents for annual physical exam- ABN was given to pt and signed, pt verbalized understanding. /) and Follow-up (Pt presents for routine follow up- pt would like you to take a look at his ears- is having a bit of ringing for a little while).    HPI     The patient presents for his annual wellness exam.   Just had executive physical done   Has lost about 20 pounds   Hx of colonoscopy: 3/24/22 polyps   Hx of prostate cancer screening (men 55-69): just done- neg    Immunizations: tdap - declines today   History of depression or anxiety: no   Diet: Follows a healthy diet  Exercise: Gets regular exercise      HPL- on statin, aspirin   CT calcium score 163 in 2019; increased to 219 July 2024   Nuclear stress neg 2022   LP(a) 69   He denies chest pain, palpitations, dyspnea      HTN- on lisinopril 10 mg   Does not check BP at home      Follows with Dr. 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 Gonsalves - not on meds now   TSH normal      Labs just done and reviewed   B12 was low at 270     States had hearing test done that showed hearing loss, told needs to repeat hearing test after getting wax cleaned out  Ear fullness  No pain   +tinnitus    Current Outpatient Medications   Medication Sig Dispense Refill    U-C-M-zinc-sod selenate-copper 5,000-60-30 unit-mg-unit tablet Take 1 tablet by mouth once daily.      aspirin 81 mg EC tablet Take 1 tablet (81 mg) by mouth once daily.      budesonide-formoteroL (Symbicort) 80-4.5 mcg/actuation inhaler USE 2 INHALATIONS TWICE A DAY (RINSE MOUTH AFTER USE) 30.6 g 3    ketoconazole (NIZOral) 2 % cream Apply topically once daily.      montelukast (Singulair) 10 mg tablet Take 1 tablet (10 mg) by mouth once  "daily at bedtime. 90 tablet 3    triamcinolone (Kenalog) 0.1 % cream APPLY TOPICALLY TWICE A DAY AS NEEDED FOR RASH OR IRRITATION 80 g 1    lisinopril 10 mg tablet Take 1 tablet (10 mg) by mouth once daily. 90 tablet 3    rosuvastatin (Crestor) 20 mg tablet Take 1 tablet (20 mg) by mouth once daily. 90 tablet 3     No current facility-administered medications for this visit.       Review of Systems   Constitutional:  Negative for chills, fatigue and fever.   HENT:  Positive for tinnitus. Negative for congestion, ear pain and sore throat.    Eyes:  Negative for visual disturbance.   Respiratory:  Negative for cough and shortness of breath.    Cardiovascular:  Negative for chest pain, palpitations and leg swelling.   Gastrointestinal:  Negative for abdominal pain, constipation, diarrhea, nausea and vomiting.   Genitourinary: Negative.    Musculoskeletal: Negative.    Skin:  Negative for rash.   Neurological:  Negative for dizziness, weakness, numbness and headaches.   Psychiatric/Behavioral: Negative.           Objective   /75 (BP Location: Right arm, Patient Position: Sitting, BP Cuff Size: Adult)   Pulse 64   Temp 36.3 °C (97.4 °F) (Temporal)   Resp 14   Ht 1.676 m (5' 6\")   Wt 76.6 kg (168 lb 12.8 oz)   SpO2 97%   BMI 27.25 kg/m²     Physical Exam    Constitutional: Well developed, well nourished, alert and in no acute distress.  Head and Face: Normocephalic, atraumatic.  Eyes: Normal external exam. Pupils equally round and reactive to light with normal accommodation and extraocular movements intact.   ENT: External inspection of ears normal, tympanic membrane visualized and normal on left, TM obstructed by cerumen on right. Nasal mucosa, septum, and turbinates normal. Oral mucosa moist, oropharynx clear.   Neck: Supple, no lymphadenopathy or masses. Thyroid not enlarged, no palpable nodules.   Cardiovascular: Regular rate and rhythm, normal S1 and S2, no murmurs, gallops, or rubs. Radial pulses " normal. No peripheral edema. No carotid bruits.   Pulmonary: No respiratory distress, lungs clear to auscultation bilaterally. No wheezes, rhonchi, rales.  Abdomen: Soft, nontender, nondistended, normal bowel sounds. No masses palpated.   Musculoskeletal: Gait normal. Muscle strength/tone normal. Normal range of motion of all extremities.   Skin: Warm, well perfused, normal skin turgor and color, no lesions or rashes noted.   Neurologic: Cranial nerves II-XII grossly intact. Sensation normal bilaterally.   Psychiatric: Mood calm and affect normal.      Assessment/Plan   Problem List Items Addressed This Visit             ICD-10-CM    Benign essential hypertension I10     Controlled, continue lisinopril 10 mg daily          Relevant Medications    lisinopril 10 mg tablet    Hyperlipidemia E78.5     Increase crestor to 20 mg nightly          Relevant Medications    rosuvastatin (Crestor) 20 mg tablet    Sensorineural hearing loss, bilateral H90.3     Refer for further testing          Relevant Orders    Referral to Audiology    Referral to ENT    Thyroiditis, subacute E06.1     History of, previously treated with prednisone- Dr. Gonsalves - TSH normal now off meds          Overweight with body mass index (BMI) of 27 to 27.9 in adult E66.3, Z68.27    Agatston coronary artery calcium score between 100 and 400 R93.1    B12 deficiency E53.8     Start oral B12 supplementation- 1,000 mcg daily          Elevated Lp(a) E78.41     Other Visit Diagnoses         Codes    Annual physical exam    -  Primary Z00.00    Relevant Orders    Follow Up In [facility] - PCP - Established    Impacted cerumen of right ear     H61.21    Relevant Orders    Ear cerumen removal          I was unable to completely remove the cerumen with use of magnification provided by an otoscope and an ear curette.  Cerumen irrigation was performed in the office with water and peroxide without success by the medical assistant.  Patient had difficulty  tolerating so it was stopped.  He was instructed to try Debrox and can return for repeat irrigation in 1 week.        Carolann Chaney, DO  8/8/2024

## 2024-08-08 ENCOUNTER — APPOINTMENT (OUTPATIENT)
Dept: PRIMARY CARE | Facility: CLINIC | Age: 59
End: 2024-08-08
Payer: COMMERCIAL

## 2024-08-08 VITALS
SYSTOLIC BLOOD PRESSURE: 114 MMHG | DIASTOLIC BLOOD PRESSURE: 75 MMHG | BODY MASS INDEX: 27.13 KG/M2 | TEMPERATURE: 97.4 F | WEIGHT: 168.8 LBS | OXYGEN SATURATION: 97 % | RESPIRATION RATE: 14 BRPM | HEIGHT: 66 IN | HEART RATE: 64 BPM

## 2024-08-08 DIAGNOSIS — E06.1 THYROIDITIS, SUBACUTE: ICD-10-CM

## 2024-08-08 DIAGNOSIS — H61.21 IMPACTED CERUMEN OF RIGHT EAR: ICD-10-CM

## 2024-08-08 DIAGNOSIS — Z00.00 ANNUAL PHYSICAL EXAM: Primary | ICD-10-CM

## 2024-08-08 DIAGNOSIS — I10 BENIGN ESSENTIAL HYPERTENSION: ICD-10-CM

## 2024-08-08 DIAGNOSIS — H90.3 SENSORINEURAL HEARING LOSS, BILATERAL: ICD-10-CM

## 2024-08-08 DIAGNOSIS — E78.41 ELEVATED LP(A): ICD-10-CM

## 2024-08-08 DIAGNOSIS — E53.8 B12 DEFICIENCY: ICD-10-CM

## 2024-08-08 DIAGNOSIS — E78.5 HYPERLIPIDEMIA, UNSPECIFIED HYPERLIPIDEMIA TYPE: ICD-10-CM

## 2024-08-08 DIAGNOSIS — E66.3 OVERWEIGHT WITH BODY MASS INDEX (BMI) OF 27 TO 27.9 IN ADULT: ICD-10-CM

## 2024-08-08 DIAGNOSIS — R93.1 AGATSTON CORONARY ARTERY CALCIUM SCORE BETWEEN 100 AND 400: ICD-10-CM

## 2024-08-08 PROBLEM — R53.83 MALAISE AND FATIGUE: Status: RESOLVED | Noted: 2024-02-22 | Resolved: 2024-08-08

## 2024-08-08 PROBLEM — J02.9 PHARYNGITIS: Status: RESOLVED | Noted: 2024-02-22 | Resolved: 2024-08-08

## 2024-08-08 PROBLEM — M25.511 RIGHT SHOULDER PAIN: Status: RESOLVED | Noted: 2023-08-05 | Resolved: 2024-08-08

## 2024-08-08 PROBLEM — R10.9 ABDOMINAL PAIN: Status: RESOLVED | Noted: 2024-02-22 | Resolved: 2024-08-08

## 2024-08-08 PROBLEM — R91.8 LUNG MASS: Status: RESOLVED | Noted: 2024-02-22 | Resolved: 2024-08-08

## 2024-08-08 PROBLEM — R06.02 SHORTNESS OF BREATH: Status: RESOLVED | Noted: 2024-02-22 | Resolved: 2024-08-08

## 2024-08-08 PROBLEM — R53.81 MALAISE AND FATIGUE: Status: RESOLVED | Noted: 2024-02-22 | Resolved: 2024-08-08

## 2024-08-08 PROBLEM — J32.9 SINUSITIS: Status: RESOLVED | Noted: 2024-02-22 | Resolved: 2024-08-08

## 2024-08-08 PROBLEM — D50.9 IRON DEFICIENCY ANEMIA: Status: RESOLVED | Noted: 2024-02-22 | Resolved: 2024-08-08

## 2024-08-08 PROBLEM — R07.9 CHEST PAIN: Status: RESOLVED | Noted: 2024-02-22 | Resolved: 2024-08-08

## 2024-08-08 PROCEDURE — 3008F BODY MASS INDEX DOCD: CPT | Performed by: FAMILY MEDICINE

## 2024-08-08 PROCEDURE — 3074F SYST BP LT 130 MM HG: CPT | Performed by: FAMILY MEDICINE

## 2024-08-08 PROCEDURE — 3078F DIAST BP <80 MM HG: CPT | Performed by: FAMILY MEDICINE

## 2024-08-08 PROCEDURE — 1036F TOBACCO NON-USER: CPT | Performed by: FAMILY MEDICINE

## 2024-08-08 PROCEDURE — 99396 PREV VISIT EST AGE 40-64: CPT | Performed by: FAMILY MEDICINE

## 2024-08-08 RX ORDER — LISINOPRIL 10 MG/1
10 TABLET ORAL DAILY
Qty: 90 TABLET | Refills: 3 | Status: SHIPPED | OUTPATIENT
Start: 2024-08-08 | End: 2025-08-08

## 2024-08-08 RX ORDER — ROSUVASTATIN CALCIUM 20 MG/1
20 TABLET, COATED ORAL DAILY
Qty: 90 TABLET | Refills: 3 | Status: SHIPPED | OUTPATIENT
Start: 2024-08-08 | End: 2025-08-08

## 2024-08-08 ASSESSMENT — ENCOUNTER SYMPTOMS
SORE THROAT: 0
NUMBNESS: 0
FATIGUE: 0
NAUSEA: 0
WEAKNESS: 0
MUSCULOSKELETAL NEGATIVE: 1
COUGH: 0
HEADACHES: 0
VOMITING: 0
CONSTIPATION: 0
PSYCHIATRIC NEGATIVE: 1
ABDOMINAL PAIN: 0
CHILLS: 0
PALPITATIONS: 0
DIZZINESS: 0
FEVER: 0
DIARRHEA: 0
SHORTNESS OF BREATH: 0

## 2024-08-08 ASSESSMENT — LIFESTYLE VARIABLES: HOW OFTEN DO YOU HAVE A DRINK CONTAINING ALCOHOL: 2-3 TIMES A WEEK

## 2024-08-26 ENCOUNTER — OFFICE VISIT (OUTPATIENT)
Dept: PRIMARY CARE | Facility: CLINIC | Age: 59
End: 2024-08-26
Payer: COMMERCIAL

## 2024-08-26 VITALS
DIASTOLIC BLOOD PRESSURE: 77 MMHG | RESPIRATION RATE: 14 BRPM | BODY MASS INDEX: 28.89 KG/M2 | SYSTOLIC BLOOD PRESSURE: 131 MMHG | OXYGEN SATURATION: 96 % | TEMPERATURE: 97.5 F | WEIGHT: 179 LBS | HEART RATE: 69 BPM

## 2024-08-26 DIAGNOSIS — I10 BENIGN ESSENTIAL HYPERTENSION: ICD-10-CM

## 2024-08-26 DIAGNOSIS — R21 RASH: Primary | ICD-10-CM
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
## 2024-08-26 PROCEDURE — 3078F DIAST BP <80 MM HG: CPT | Performed by: FAMILY MEDICINE

## 2024-08-26 PROCEDURE — 99214 OFFICE O/P EST MOD 30 MIN: CPT | Performed by: FAMILY MEDICINE

## 2024-08-26 PROCEDURE — 1036F TOBACCO NON-USER: CPT | Performed by: FAMILY MEDICINE

## 2024-08-26 PROCEDURE — 3075F SYST BP GE 130 - 139MM HG: CPT | Performed by: FAMILY MEDICINE

## 2024-08-26 RX ORDER — LISINOPRIL 10 MG/1
10 TABLET ORAL DAILY
Qty: 90 TABLET | Refills: 3 | Status: SHIPPED | OUTPATIENT
Start: 2024-08-26 | End: 2024-08-26 | Stop reason: SDUPTHER

## 2024-08-26 RX ORDER — SULFAMETHOXAZOLE AND TRIMETHOPRIM 800; 160 MG/1; MG/1
1 TABLET ORAL 2 TIMES DAILY
Qty: 20 TABLET | Refills: 0 | Status: SHIPPED | OUTPATIENT
Start: 2024-08-26 | End: 2024-09-05

## 2024-08-26 RX ORDER — LISINOPRIL 10 MG/1
10 TABLET ORAL DAILY
Qty: 30 TABLET | Refills: 3 | Status: SHIPPED | OUTPATIENT
Start: 2024-08-26 | End: 2025-08-26

## 2024-08-26 NOTE — TELEPHONE ENCOUNTER
(Per Lisy msg)   [facility] said they sent the Lisinopril and don't want to hear about the fact i didn't receive it.  I guess i will stop using [facility] and go back to [facility].  Can you send in a Prescription to [facility]?      You are welcome to call me as well, [phone]

## 2024-08-26 NOTE — PROGRESS NOTES
Subjective        Bandar Loving is a 59 y.o. male who presents for      HPI:    Dr Chaney pt       No chief complaint on file.    What concern/ problem/pain/symptom  brings you here today? Mole on left side of back that appears to be infected      how long has pt had sxs? A week      describe symptoms- brown spot with redness around the spot    Any new exposures?  Lotions/creams- No  Pets- No  Travel- [place] within the last week- wife noticed that area in that time  New Medication- no            has pt tried anything for current symptoms, including medications (OTC or prescription)? No       what makes symptoms worse? N/A      has pt been seen recently for this problem ( within past 2-3 weeks)? No    if yes- where?    by who?    what treatment was provided?                  Social History     Tobacco Use   Smoking Status Never    Passive exposure: Never   Smokeless Tobacco Never         Social History     Substance and Sexual Activity   Alcohol Use Yes          Review of Systems:        Objective        There were no vitals filed for this visit.        Patient is alert and oriented x 3 , NAD  HEAD- normocephalic and atraumatic   EYES-conjunctiva-normal, lids - normal  EARS/NOSE- normal external exam   Bilateral cerumen- hard and deep in canal   SKIN- left midback - 1 cm small crusty skin lesion with marginal erythema -slight swelling , no drainage , mildly tender, minimal warmth             BP Readings from Last 3 Encounters:   08/08/24 114/75   02/27/24 (!) 131/92   02/08/24 134/82           Wt Readings from Last 3 Encounters:   08/08/24 76.6 kg (168 lb 12.8 oz)   02/27/24 82.1 kg (181 lb)   02/08/24 84.2 kg (185 lb 11.2 oz)         BMI Readings from Last 3 Encounters:   08/08/24 27.25 kg/m²   02/27/24 29.21 kg/m²   02/08/24 29.97 kg/m²       The number and complexity of problems addressed is considered moderate.  The amount and/or complexity of data reviewed and analyzed is considered moderate. The risk of  complications and/or morbidity/mortality of patient is considered moderate. Overall, this patient encounter is considered a moderate risk visit.    What are antibiotics?  Antibiotics are medicines that help people fight infections caused by bacteria. They work by killing bacteria that are in the body. These medicines come in many different forms, including pills, ointments, and liquids that are given by injection.    Antibiotics can do a lot of good. For people with serious bacterial infections, antibiotics can save lives. But people use them far too often, even when they're not needed. This is causing a very serious problem called antibiotic resistance. Antibiotic resistance happens when bacteria that have been exposed to an antibiotic change so that the antibiotic can no longer kill them. In those bacteria, the antibiotic has no effect. Because of this problem, doctors are having a harder and harder time treating infections. Experts worry that there will soon be infections that don't respond to any antibiotics.    When are antibiotics helpful?  Antibiotics can help people fight off infections caused by bacteria. They do not work on infections caused by viruses.    Some common bacterial infections that are treated with antibiotics include:    Strep throat    Pneumonia (an infection of the lungs)    Bladder infections    Infections you catch through sex, such as gonorrhea and chlamydia    When are antibiotics NOT helpful?    Antibiotics do not work on infections caused by viruses.    Antibiotics are not helpful for the common cold, because the common cold is caused by a virus.    Antibiotics are not helpful for the flu, because the flu is caused by a virus. (People with the flu can be treated with medicines called antiviral medicines, which are different from antibiotics.)      Even though antibiotics don't work on infections caused by viruses, people sometimes believe that they do. That's because they took  antibiotics for a viral infection before and then got better. The problem is that those people would have gotten better with or without an antibiotic. When they get better with the antibiotic, they think that's what cured them, when in reality the antibiotic had nothing to do with it.    What's the harm in taking antibiotics even if they might not help?  There are many reasons you should not take antibiotics unless you absolutely need them:    Antibiotics cause side effects, such as nausea, vomiting, and diarrhea. They can even make it more likely that you will get a different kind of infection, such as yeast infection (if you are a woman).    Allergies to antibiotics are common. You can develop an allergy to an antibiotic, even if you have not had a problem with it before. Some allergies are just unpleasant, causing rashes and itching. But some can be very serious and even life-threatening. It is better to avoid any risk of an allergy, if the antibiotic wouldn't help you anyway.    Overuse of antibiotics leads to antibiotic resistance. Using antibiotics when they are not needed gives bacteria a chance to change, so that the antibiotics cannot hurt them later on. People who have infections caused by antibiotic-resistant bacteria often have to be treated in the hospital with many different antibiotics. People can even die from these infections, because there is no antibiotic that will cure them.    When should I take antibiotics?  You should take antibiotics only when a doctor or nurse prescribes them to you. You should never take antibiotics prescribed to someone else, and you should not take antibiotics that were prescribed to you for a previous illness. When prescribing an antibiotic, doctors and nurses have to carefully pick the right antibiotic for a particular infection. Not all antibiotics work on all bacteria.    If you do have an infection caused by a bacteria, your doctor or nurse might want to find out  "what that bacteria is, and which antibiotics can kill it. They do this by taking a \"culture\" of the bacteria and growing it in the lab. But it's not possible to do a culture on someone who has already started an antibiotic. So if you start an antibiotic without input from a doctor or nurse it will be impossible to know if you have taken the right one.    What can I do to reduce antibiotic resistance?  Here are some things that can help:    Do not pressure your doctor or nurse for antibiotics when they do not think you need them.    If you are prescribed antibiotics, finish all of the medicine and take it exactly as directed. Never skip doses or stop taking the medicine without talking to your doctor or nurse.    Do not give antibiotics that were prescribed to you to anyone else.    What if my doctor prescribes an antibiotic that did not work for me before?  If an antibiotic did not work for you before, that does not mean it will never work for you. If you have used an antibiotic before and it did not work, tell your doctor. But keep in mind that the infection you had before might not have been caused by the same bacteria that you have now. The \"best\" antibiotic is the right one for the bacteria causing the infection, not for the person with the infection.    What if I am allergic to an antibiotic?  If you had a bad reaction to an antibiotic, tell your doctor or nurse about it. But do not assume you are allergic unless your doctor or nurse tells you that you are.    Many people who think they are allergic to an antibiotic are wrong. If you get nauseous after taking an antibiotic, that does not mean you are allergic to it. Nausea is a common side effect of many antibiotics. If you are a woman and you get a yeast infection after taking an antibiotic, that does not mean you are allergic to it. Yeast infections are a common side effect of antibiotics.    Symptoms of antibiotic allergy can be mild and include a flat rash " and itching. They can also be more serious and include:    Hives - Hives are raised, red patches of skin that are usually very itchy (picture 1).    Lip or tongue swelling    Trouble swallowing or breathing    Serious allergy symptoms can start right after you start taking an antibiotic if you are very sensitive. Less serious symptoms, on the other hand, often start a day or more later.    Almost all antibiotics may cause possible side effects of allergic reaction, nausea, vomiting, diarrhea- most worrisome caused by C. diff, and secondary yeast infection.    Assessment & Plan  Rash  Bactrim       Orders:    sulfamethoxazole-trimethoprim (Bactrim DS) 800-160 mg tablet; Take 1 tablet by mouth 2 times a day for 10 days.    Insect bite, unspecified site, initial encounter    Orders:    sulfamethoxazole-trimethoprim (Bactrim DS) 800-160 mg tablet; Take 1 tablet by mouth 2 times a day for 10 days.                 Has appointment with ENT- 9/10/24 Dr Murphy - will evaluate hearing and cerumen         Call if no better or if symptoms worsen

## 2024-09-10 ENCOUNTER — APPOINTMENT (OUTPATIENT)
Dept: OTOLARYNGOLOGY | Facility: CLINIC | Age: 59
End: 2024-09-10
Payer: COMMERCIAL

## 2024-09-10 VITALS
SYSTOLIC BLOOD PRESSURE: 131 MMHG | TEMPERATURE: 97.5 F | BODY MASS INDEX: 28.89 KG/M2 | HEIGHT: 66 IN | DIASTOLIC BLOOD PRESSURE: 89 MMHG

## 2024-09-10 DIAGNOSIS — H93.13 TINNITUS OF BOTH EARS: ICD-10-CM

## 2024-09-10 DIAGNOSIS — H90.3 BILATERAL SENSORINEURAL HEARING LOSS: Primary | ICD-10-CM

## 2024-09-10 DIAGNOSIS — H61.23 BILATERAL IMPACTED CERUMEN: ICD-10-CM

## 2024-09-10 PROCEDURE — 99204 OFFICE O/P NEW MOD 45 MIN: CPT | Performed by: OTOLARYNGOLOGY

## 2024-09-10 PROCEDURE — 3079F DIAST BP 80-89 MM HG: CPT | Performed by: OTOLARYNGOLOGY

## 2024-09-10 PROCEDURE — 69210 REMOVE IMPACTED EAR WAX UNI: CPT | Performed by: OTOLARYNGOLOGY

## 2024-09-10 PROCEDURE — 1036F TOBACCO NON-USER: CPT | Performed by: OTOLARYNGOLOGY

## 2024-09-10 PROCEDURE — 3075F SYST BP GE 130 - 139MM HG: CPT | Performed by: OTOLARYNGOLOGY

## 2024-09-10 NOTE — PROGRESS NOTES
Impression:  1. Bilateral sensorineural hearing loss        2. Tinnitus of both ears        3. Bilateral impacted cerumen             RECOMMENDATIONS/PLAN :  I advised the patient to protect his hearing from any future loud noise exposure and we will repeat an audiogram in 1 year or sooner with any sudden changes.  We discussed various masking techniques to help cover up the ringing in his ears.  I was able to remove all of his impacted cerumen today and he was feeling much better.      **This electronic medical record note was created with the use of voice recognition software.  Despite proofreading, typographical or grammatical errors may be present that could affect meaning of content **    Subjective   Patient ID:     Bandar Loving is a 59 y.o. male who presents to the office today with a known mild hearing loss.  He has had previous loud noise exposure with work.  He denies any vertigo or pressure in the ears.  No history of middle ear infections or drainage from the ears.  He also complains of a nonpulsating type ringing in the ears that seems to come and go.  He believes he may have wax impaction as well.    ROS:  A detailed 12 system review of systems is noted on the intake form has been reviewed with the patient with details noted in the HPI and scanned into the patient's medical record.    Objective     Past Medical History:   Diagnosis Date    Acute prostatitis 06/29/2015    Acute bacterial prostatitis    Disorder of pigmentation, unspecified 06/04/2019    Pigmented skin lesion of uncertain nature    Epigastric pain 05/02/2019    Dyspepsia    History of colonoscopy 03/24/2022    3/24/22 polyps    Pain in right shoulder 05/27/2022    Right shoulder pain    Personal history of other diseases of the digestive system 03/25/2019    History of hepatomegaly    Personal history of other diseases of the digestive system 05/02/2019    History of melena    Personal history of other diseases of the respiratory system      History of asthma    Personal history of other infectious and parasitic diseases 11/30/2021    History of dermatophytosis    Personal history of other specified conditions 04/13/2022    History of chest pain    Personal history of other specified conditions 02/15/2022    History of abdominal pain    Solitary pulmonary nodule 02/23/2021    Lung nodule    Unspecified asthma, uncomplicated ([facility]) 03/18/2016    Asthmatic bronchitis        Past Surgical History:   Procedure Laterality Date    MR HEAD ANGIO WO IV CONTRAST  3/4/2015    MR HEAD ANGIO WO IV CONTRAST 3/4/2015 [facility]CILLARY LEGAC[facility]    MR NECK ANGIO WO IV CONTRAST  3/4/2015    MR NECK ANGIO WO IV CONTRAST 3/4/2015 [facility] [place]    OTHER SURGICAL HISTORY  03/25/2019    Colonoscopy    TONSILLECTOMY  09/29/2014    Tonsillectomy        No Known Allergies       Current Outpatient Medications:     aspirin 81 mg EC tablet, Take 1 tablet (81 mg) by mouth once daily., Disp: , Rfl:     budesonide-formoteroL (Symbicort) 80-4.5 mcg/actuation inhaler, USE 2 INHALATIONS TWICE A DAY (RINSE MOUTH AFTER USE), Disp: 30.6 g, Rfl: 3    lisinopril 10 mg tablet, Take 1 tablet (10 mg) by mouth once daily., Disp: 30 tablet, Rfl: 3    rosuvastatin (Crestor) 20 mg tablet, Take 1 tablet (20 mg) by mouth once daily., Disp: 90 tablet, Rfl: 3    triamcinolone (Kenalog) 0.1 % cream, APPLY TOPICALLY TWICE A DAY AS NEEDED FOR RASH OR IRRITATION, Disp: 80 g, Rfl: 1    T-R-O-zinc-sod selenate-copper 5,000-60-30 unit-mg-unit tablet, Take 1 tablet by mouth once daily., Disp: , Rfl:     ketoconazole (NIZOral) 2 % cream, Apply topically once daily., Disp: , Rfl:     montelukast (Singulair) 10 mg tablet, Take 1 tablet (10 mg) by mouth once daily at bedtime., Disp: 90 tablet, Rfl: 3     Tobacco Use: Low Risk  (9/10/2024)    Patient History     Smoking Tobacco Use: Never     Smokeless Tobacco Use: Never     Passive Exposure: Never   Recent Concern: Tobacco Use - Medium Risk (8/1/2024)     "Received from [facility]    Patient History     Smoking Tobacco Use: Former     Smokeless Tobacco Use: Former     Passive Exposure: Not on file        Alcohol Use: Unknown (8/8/2024)    AUDIT-C     Frequency of Alcohol Consumption: 2-3 times a week     Average Number of Drinks: Not on file     Frequency of Binge Drinking: Not on file        Social History     Substance and Sexual Activity   Drug Use Never        Physical Exam:  Visit Vitals  /89   Temp 36.4 °C (97.5 °F) (Temporal)   Ht 1.676 m (5' 6\")   BMI 28.89 kg/m²   Smoking Status Never   BSA 1.94 m²      General: Patient is alert, oriented, cooperative in no apparent distress.  Head: Normocephalic, atraumatic.  Eyes: PERRL, EOMI, Conjunctiva is clear. No nystagmus.  Ears: Right Ear-- Pinna is normal.  External auditory canal is occluded with cerumen. Tympanic membrane is [intact, translucent and has good mobility with my pneumatic otoscope. No effusion].  Mastoid is nontender.  Left ear-- Pinna is normal.  External auditory canal is occluded with cerumen. Tympanic membrane is [intact, translucent and has good mobility with my pneumatic otoscope.  No effusion].  Mastoid is nontender.  Nose: Septum is straight.  No septal perforation or lesions. No septal hematoma/ seroma.  No signs of bleeding.  Inferior turbinates are normal.   No evidence of intranasal polyps.  No infectious drainage.  Throat:  Floor of mouth is clear, no masses.  Tongue appears normal, no lesions or masses. Gums, gingiva, buccal mucosa appear pink and moist, no lesions. Teeth are in good repair.  No obvious dental infections.  Peritonsillar regions appear symmetric without swelling.  Hard and soft palate appear normal, no obvious cleft. Uvula is midline.  Oropharynx: No lesions. Retropharyngeal wall is flat.  No active postnasal drip.  Neck: Supple,  no lymphadenopathy.  No masses.  Salivary Glands: Symmetric bilaterally.  No palpable masses.  No evidence of acute infection or " salivary stones  Neurologic: Cranial Nerves 2-12 are grossly intact without focal deficits. Cerebellar function testing is normal.     Results:   I reviewed his recent audiogram and he does have a mild high-frequency sensorineural loss in both ears.    Procedure:   After informed consent was obtained with the risks, benefits, complications, and alternatives explained to the patient / guardian, the patient was laid back in the ENT chair and the operative microscope was brought to the [left] ear.  A speculum was placed and using the operative microscope and/or curette/ suction, I was able to carefully remove all of the impacted cerumen that was causing pain/ hearing loss.  After doing so, the patient was feeling much better and had much less pain.  The TM was [intact, translucent and had good mobility with my pneumatic otoscope].  The head was rotated to the opposite side and attention was brought to the [right] ear.  A speculum was placed and using the operative microscope and/or curette/ suction, I was able to remove all of the impacted cerumen that was causing pain and hearing loss.  After doing so, the patient was feeling much better and had much less pain.  The TM was [ intact, translucent and had good mobility with my pneumatic otoscope].  The patient tolerated the procedure well and there were no complications.      Anvial Murphy, DO

## 2024-12-24 ENCOUNTER — OFFICE VISIT (OUTPATIENT)
Dept: URGENT CARE | Age: 59
End: 2024-12-24
Payer: COMMERCIAL

## 2024-12-24 VITALS
DIASTOLIC BLOOD PRESSURE: 93 MMHG | OXYGEN SATURATION: 97 % | WEIGHT: 170 LBS | TEMPERATURE: 97.7 F | SYSTOLIC BLOOD PRESSURE: 156 MMHG | BODY MASS INDEX: 27.44 KG/M2 | RESPIRATION RATE: 16 BRPM | HEART RATE: 96 BPM

## 2024-12-24 DIAGNOSIS — J45.21 MILD INTERMITTENT ASTHMA WITH EXACERBATION (HHS-HCC): Primary | ICD-10-CM

## 2024-12-24 DIAGNOSIS — J06.9 VIRAL URI: ICD-10-CM

## 2024-12-24 DIAGNOSIS — J30.9 ALLERGIC RHINITIS, UNSPECIFIED SEASONALITY, UNSPECIFIED TRIGGER: ICD-10-CM

## 2024-12-24 LAB
POC BINAX EXPIRATION: NORMAL
POC BINAX NOW COVID SERIAL NUMBER: NORMAL
POC SARS-COV-2 AG BINAX: NORMAL

## 2024-12-24 PROCEDURE — 1036F TOBACCO NON-USER: CPT | Performed by: PHYSICIAN ASSISTANT

## 2024-12-24 PROCEDURE — 99203 OFFICE O/P NEW LOW 30 MIN: CPT | Performed by: PHYSICIAN ASSISTANT

## 2024-12-24 PROCEDURE — 87811 SARS-COV-2 COVID19 W/OPTIC: CPT | Performed by: PHYSICIAN ASSISTANT

## 2024-12-24 PROCEDURE — 3077F SYST BP >= 140 MM HG: CPT | Performed by: PHYSICIAN ASSISTANT

## 2024-12-24 PROCEDURE — 3080F DIAST BP >= 90 MM HG: CPT | Performed by: PHYSICIAN ASSISTANT

## 2024-12-24 RX ORDER — BENZONATATE 200 MG/1
200 CAPSULE ORAL 3 TIMES DAILY PRN
Qty: 21 CAPSULE | Refills: 0 | Status: SHIPPED | OUTPATIENT
Start: 2024-12-24 | End: 2024-12-31

## 2024-12-24 RX ORDER — PREDNISONE 20 MG/1
20 TABLET ORAL 2 TIMES DAILY
Qty: 10 TABLET | Refills: 0 | Status: SHIPPED | OUTPATIENT
Start: 2024-12-24 | End: 2024-12-29

## 2024-12-24 RX ORDER — AZITHROMYCIN 250 MG/1
TABLET, FILM COATED ORAL
Qty: 6 TABLET | Refills: 0 | Status: SHIPPED | OUTPATIENT
Start: 2024-12-24

## 2024-12-24 ASSESSMENT — ENCOUNTER SYMPTOMS
EYE ITCHING: 0
DIARRHEA: 0
ARTHRALGIAS: 0
CHEST TIGHTNESS: 0
FATIGUE: 0
FEVER: 0
COUGH: 1
SORE THROAT: 0
EYE REDNESS: 0
SHORTNESS OF BREATH: 1
EYE PAIN: 0
JOINT SWELLING: 0
ABDOMINAL PAIN: 0
CHILLS: 0
VOMITING: 0
NAUSEA: 0
RHINORRHEA: 1
SINUS PAIN: 0
EYE DISCHARGE: 0

## 2024-12-24 NOTE — PROGRESS NOTES
Subjective   Patient ID: Bandar Loving is a 59 y.o. male. They present today with a chief complaint of Cough (Cough, sneezing up yellow, ear issues x 3rd day).    History of Present Illness  Pt has hx of allergic rhinitis and asthma. Reports worsening of nasal congestion and ear pressure but more concerned for cough and intermittent SOB. Continue using home regimens. Denies fever, chills. Denies exposure.       Cough  Associated symptoms include ear pain, rhinorrhea and shortness of breath. Pertinent negatives include no chest pain, chills, eye redness, fever, rash or sore throat.       Past Medical History  Allergies as of 12/24/2024    (No Known Allergies)       (Not in a hospital admission)       Past Medical History:   Diagnosis Date    Acute prostatitis 06/29/2015    Acute bacterial prostatitis    Disorder of pigmentation, unspecified 06/04/2019    Pigmented skin lesion of uncertain nature    Epigastric pain 05/02/2019    Dyspepsia    History of colonoscopy 03/24/2022    3/24/22 polyps    Pain in right shoulder 05/27/2022    Right shoulder pain    Personal history of other diseases of the digestive system 03/25/2019    History of hepatomegaly    Personal history of other diseases of the digestive system 05/02/2019    History of melena    Personal history of other diseases of the respiratory system     History of asthma    Personal history of other infectious and parasitic diseases 11/30/2021    History of dermatophytosis    Personal history of other specified conditions 04/13/2022    History of chest pain    Personal history of other specified conditions 02/15/2022    History of abdominal pain    Solitary pulmonary nodule 02/23/2021    Lung nodule    Unspecified asthma, uncomplicated ([facility]) 03/18/2016    Asthmatic bronchitis       Past Surgical History:   Procedure Laterality Date    MR HEAD ANGIO WO IV CONTRAST  3/4/2015    MR HEAD ANGIO WO IV CONTRAST 3/4/2015 [facility] ANCILLARY LEGACY    MR NECK ANGIO WO IV  CONTRAST  3/4/2015    MR NECK ANGIO WO IV CONTRAST 3/4/2015 [facility] [place] [facility]GAC[facility]    OTHER SURGICAL HISTORY  03/25/2019    Colonoscopy    TONSILLECTOMY  09/29/2014    Tonsillectomy        reports that he has never smoked. He has never been exposed to tobacco smoke. He has never used smokeless tobacco. He reports current alcohol use. He reports that he does not use drugs.    Review of Systems  Review of Systems   Constitutional:  Negative for chills, fatigue and fever.   HENT:  Positive for congestion, ear pain and rhinorrhea. Negative for ear discharge, sinus pain, sneezing and sore throat.    Eyes:  Negative for pain, discharge, redness and itching.   Respiratory:  Positive for cough and shortness of breath. Negative for chest tightness.    Cardiovascular:  Negative for chest pain.   Gastrointestinal:  Negative for abdominal pain, diarrhea, nausea and vomiting.   Musculoskeletal:  Negative for arthralgias and joint swelling.   Skin:  Negative for rash.                                  Objective    Vitals:    12/24/24 1508   BP: (!) 156/93   Pulse: 96   Resp: 16   Temp: 36.5 °C (97.7 °F)   SpO2: 97%   Weight: 77.1 kg (170 lb)     No LMP for male patient.    Physical Exam  Constitutional:       Appearance: Normal appearance.   HENT:      Head: Normocephalic and atraumatic.      Right Ear: Tympanic membrane, ear canal and external ear normal.      Left Ear: Tympanic membrane, ear canal and external ear normal.      Nose: Congestion present.      Right Sinus: No maxillary sinus tenderness or frontal sinus tenderness.      Left Sinus: No maxillary sinus tenderness or frontal sinus tenderness.      Mouth/Throat:      Pharynx: No oropharyngeal exudate or posterior oropharyngeal erythema.   Cardiovascular:      Rate and Rhythm: Normal rate and regular rhythm.      Heart sounds: No murmur heard.  Pulmonary:      Effort: Pulmonary effort is normal. No respiratory distress.      Breath sounds: No stridor. No wheezing, rhonchi  or rales.   Lymphadenopathy:      Cervical: No cervical adenopathy.   Skin:     General: Skin is warm and dry.   Neurological:      Mental Status: He is alert and oriented to person, place, and time.   Psychiatric:         Mood and Affect: Mood normal.         Procedures    Point of Care Test & Imaging Results from this visit  Results for orders placed or performed in visit on 12/24/24   POCT Covid-19 Rapid Antigen   Result Value Ref Range    Binax NOW Covid Serial Number na     BINAX NOW Covid Expiration na     POC ERICA-COV-2 AG  Presumptive negative test for SARS-CoV-2 (no antigen detected)     Presumptive negative test for SARS-CoV-2 (no antigen detected)      No results found.    Diagnostic study results (if any) were reviewed by Kellie Rodríguez PA-C.    Assessment/Plan   Allergies, medications, history, and pertinent labs/EKGs/Imaging reviewed by Kellie Rodríguez PA-C.     Medical Decision Making  NAD, no signs of respiratory distress, reports SOB and will treat as asthma exacerbation related to viral infection    Orders and Diagnoses  Diagnoses and all orders for this visit:  Mild intermittent asthma with exacerbation ([facility])  -     azithromycin (Zithromax) 250 mg tablet; Take 2 tabs (500 mg) by mouth today, than 1 daily for 4 days.  -     benzonatate (Tessalon) 200 mg capsule; Take 1 capsule (200 mg) by mouth 3 times a day as needed for cough for up to 7 days. Do not crush or chew.  -     predniSONE (Deltasone) 20 mg tablet; Take 1 tablet (20 mg) by mouth 2 times a day for 5 days.  Allergic rhinitis, unspecified seasonality, unspecified trigger  Viral URI  -     POCT Covid-19 Rapid Antigen      Medical Admin Record      Patient disposition: Home    Electronically signed by Kellie Rodríguez PA-C  3:25 PM

## 2025-02-10 NOTE — PROGRESS NOTES
Subjective   Patient ID: Bandar Loving is a 60 y.o. male who presents for Follow-up (Pt presents for 6 mth fuv - pt state no concerns to discuss./Pt declined flu vaccine.).    HPI    Here for follow up-     HPL- on statin, aspirin   CT calcium score 163 in 2019; increased to 219 July 2024   Nuclear stress neg 2022   LP(a) 69   He denies chest pain, palpitations, dyspnea      HTN- on lisinopril 10 mg   Does not check BP at home      Follows with Dr. 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 Gonsalves - not on meds now   TSH normal 8/2024     Low B12- taking oral B12 daily      He just got back from [place]- trip to the [facility]    Current Outpatient Medications   Medication Sig Dispense Refill    M-U-M-zinc-sod selenate-copper 5,000-60-30 unit-mg-unit tablet Take 1 tablet by mouth once daily.      aspirin 81 mg EC tablet Take 1 tablet (81 mg) by mouth once daily.      atovaquone-proguaniL (Malarone) 250-100 mg tablet PLEASE SEE ATTACHED FOR DETAILED DIRECTIONS      budesonide-formoteroL (Symbicort) 80-4.5 mcg/actuation inhaler USE 2 INHALATIONS TWICE A DAY (RINSE MOUTH AFTER USE) 30.6 g 3    ketoconazole (NIZOral) 2 % cream Apply topically once daily.      montelukast (Singulair) 10 mg tablet Take 1 tablet (10 mg) by mouth once daily at bedtime. 90 tablet 3    triamcinolone (Kenalog) 0.1 % cream APPLY TOPICALLY TWICE A DAY AS NEEDED FOR RASH OR IRRITATION 80 g 1    lisinopril 10 mg tablet Take 1 tablet (10 mg) by mouth once daily. 90 tablet 3    rosuvastatin (Crestor) 20 mg tablet Take 1 tablet (20 mg) by mouth once daily. 90 tablet 3     No current facility-administered medications for this visit.       Review of Systems   Constitutional:  Negative for chills, fatigue and fever.   Respiratory:   Negative for cough and shortness of breath.    Cardiovascular:  Negative for chest pain and palpitations.         Scales reviewed    Patient Health Questionnaire-2 Score: 0 (02/11/25 0938)       Objective   /87 (BP Location: Left arm, Patient Position: Sitting, BP Cuff Size: Large adult)   Pulse 70   Temp 36.2 °C (97.2 °F) (Temporal)   Resp 14   Wt 82.2 kg (181 lb 3.2 oz)   SpO2 97%   BMI 29.25 kg/m²     Physical Exam    Constitutional: Well developed, well nourished, alert and in no acute distress   Eyes: Normal external exam. Pupils equally round and reactive to light with normal accommodation and extraocular movements intact.  Cardiovascular: Regular rate and rhythm, normal S1 and S2, no murmurs, gallops, or rubs. Radial pulses normal. No peripheral edema.  Pulmonary: No respiratory distress, lungs clear to auscultation bilaterally. No wheezes, rhonchi, rales.  Skin: Warm, well perfused, normal skin turgor and color.   Neurologic: Cranial nerves II-XII grossly intact.   Psychiatric: Mood calm and affect normal.      Assessment/Plan     Problem List Items Addressed This Visit       Allergic asthma ([facility])    Current Assessment & Plan     Follows with Dr. Jefferson- uses singulair, symbicort, and albuterol just as needed         Benign essential hypertension    Current Assessment & Plan     Controlled, continue lisinopril 10 mg daily          Relevant Medications    lisinopril 10 mg tablet    Other Relevant Orders    Basic Metabolic Panel    Hyperlipidemia - Primary    Current Assessment & Plan     Check labs  Continue crestor          Relevant Medications    rosuvastatin (Crestor) 20 mg tablet    Other Relevant Orders    Lipid Panel    Thyroiditis, subacute    Current Assessment & Plan     History of, previously treated with prednisone- Dr. Gonsalves - TSH normal now off meds          Overweight with body mass index (BMI) of 29 to 29.9 in adult    Agatston coronary artery calcium score between 100 and  400    B12 deficiency    Relevant Orders    Vitamin B12    Elevated Lp(a)     Other Visit Diagnoses       Annual physical exam                Follow up in 6 months.      Carolann Chaney,   2/11/2025

## 2025-02-11 ENCOUNTER — APPOINTMENT (OUTPATIENT)
Dept: PRIMARY CARE | Facility: CLINIC | Age: 60
End: 2025-02-11
Payer: COMMERCIAL

## 2025-02-11 VITALS
TEMPERATURE: 97.2 F | SYSTOLIC BLOOD PRESSURE: 134 MMHG | RESPIRATION RATE: 14 BRPM | HEART RATE: 70 BPM | WEIGHT: 181.2 LBS | OXYGEN SATURATION: 97 % | BODY MASS INDEX: 29.25 KG/M2 | DIASTOLIC BLOOD PRESSURE: 87 MMHG

## 2025-02-11 DIAGNOSIS — E53.8 B12 DEFICIENCY: ICD-10-CM

## 2025-02-11 DIAGNOSIS — I10 BENIGN ESSENTIAL HYPERTENSION: ICD-10-CM

## 2025-02-11 DIAGNOSIS — E78.5 HYPERLIPIDEMIA, UNSPECIFIED HYPERLIPIDEMIA TYPE: Primary | ICD-10-CM

## 2025-02-11 DIAGNOSIS — R93.1 AGATSTON CORONARY ARTERY CALCIUM SCORE BETWEEN 100 AND 400: ICD-10-CM

## 2025-02-11 DIAGNOSIS — Z00.00 ANNUAL PHYSICAL EXAM: ICD-10-CM

## 2025-02-11 DIAGNOSIS — J45.20 MILD INTERMITTENT EXTRINSIC ASTHMA WITHOUT COMPLICATION (HHS-HCC): ICD-10-CM

## 2025-02-11 DIAGNOSIS — E66.3 OVERWEIGHT WITH BODY MASS INDEX (BMI) OF 29 TO 29.9 IN ADULT: ICD-10-CM

## 2025-02-11 DIAGNOSIS — E78.41 ELEVATED LP(A): ICD-10-CM

## 2025-02-11 DIAGNOSIS — E06.1 THYROIDITIS, SUBACUTE: ICD-10-CM

## 2025-02-11 PROBLEM — E03.9 HYPOTHYROIDISM (ACQUIRED): Status: RESOLVED | Noted: 2023-08-05 | Resolved: 2025-02-11

## 2025-02-11 PROBLEM — E05.90 HYPERTHYROIDISM: Status: RESOLVED | Noted: 2023-08-05 | Resolved: 2025-02-11

## 2025-02-11 PROCEDURE — 99214 OFFICE O/P EST MOD 30 MIN: CPT | Performed by: FAMILY MEDICINE

## 2025-02-11 PROCEDURE — 3075F SYST BP GE 130 - 139MM HG: CPT | Performed by: FAMILY MEDICINE

## 2025-02-11 PROCEDURE — 1036F TOBACCO NON-USER: CPT | Performed by: FAMILY MEDICINE

## 2025-02-11 PROCEDURE — 3079F DIAST BP 80-89 MM HG: CPT | Performed by: FAMILY MEDICINE

## 2025-02-11 RX ORDER — ROSUVASTATIN CALCIUM 20 MG/1
20 TABLET, COATED ORAL DAILY
Qty: 90 TABLET | Refills: 3 | Status: SHIPPED | OUTPATIENT
Start: 2025-02-11 | End: 2026-02-11

## 2025-02-11 RX ORDER — LISINOPRIL 10 MG/1
10 TABLET ORAL DAILY
Qty: 90 TABLET | Refills: 3 | Status: SHIPPED | OUTPATIENT
Start: 2025-02-11 | End: 2026-02-11

## 2025-02-11 RX ORDER — ATOVAQUONE AND PROGUANIL HYDROCHLORIDE 250; 100 MG/1; MG/1
TABLET, FILM COATED ORAL
COMMUNITY
Start: 2025-01-06

## 2025-02-11 ASSESSMENT — ENCOUNTER SYMPTOMS
SHORTNESS OF BREATH: 0
FEVER: 0
FATIGUE: 0
COUGH: 0
PALPITATIONS: 0
CHILLS: 0

## 2025-02-11 ASSESSMENT — PATIENT HEALTH QUESTIONNAIRE - PHQ9
1. LITTLE INTEREST OR PLEASURE IN DOING THINGS: NOT AT ALL
2. FEELING DOWN, DEPRESSED OR HOPELESS: NOT AT ALL
SUM OF ALL RESPONSES TO PHQ9 QUESTIONS 1 AND 2: 0

## 2025-02-11 NOTE — PROGRESS NOTES
HPI    60 y.o. male being seen with the following problem list:    Problem list:  ED    Initially seen 3/15/22 - No bothersome urinary symptoms. No hematuria, urinary tract infections, stones. Has had issues with erections over the past few years. Was previously tried on Viagra about 10 years ago but at that point was not really needing it and did not really take it. No family history of  cancers. UA today neg blood, infection. PVR 21cc. Plan for tadalafil, PSA for screening     6/15/22 - erections working well on tadalafil. no urinary concerns.      6/12/23- tadalafil continues to work well, no concern     02/12/25 - Doing well overall, erections work well on tadalafil 20mg prn. No urinary concerns.     PSA  8/2024 - 0.44    Lab Results   Component Value Date    PSA 0.37 03/28/2022    PSA 0.26 06/04/2020    PSA 0.36 03/25/2019              Current Medications:  Current Outpatient Medications   Medication Sig Dispense Refill    S-V-S-zinc-sod selenate-copper 5,000-60-30 unit-mg-unit tablet Take 1 tablet by mouth once daily.      aspirin 81 mg EC tablet Take 1 tablet (81 mg) by mouth once daily.      atovaquone-proguaniL (Malarone) 250-100 mg tablet PLEASE SEE ATTACHED FOR DETAILED DIRECTIONS      budesonide-formoteroL (Symbicort) 80-4.5 mcg/actuation inhaler USE 2 INHALATIONS TWICE A DAY (RINSE MOUTH AFTER USE) 30.6 g 3    ketoconazole (NIZOral) 2 % cream Apply topically once daily.      lisinopril 10 mg tablet Take 1 tablet (10 mg) by mouth once daily. 90 tablet 3    montelukast (Singulair) 10 mg tablet Take 1 tablet (10 mg) by mouth once daily at bedtime. 90 tablet 3    rosuvastatin (Crestor) 20 mg tablet Take 1 tablet (20 mg) by mouth once daily. 90 tablet 3    tadalafil (Cialis) 20 mg tablet Take 1 tablet (20 mg) by mouth once daily as needed for erectile dysfunction. goodrx 30 tablet 5    triamcinolone (Kenalog) 0.1 % cream APPLY TOPICALLY TWICE A DAY AS NEEDED FOR RASH OR IRRITATION 80 g 1     No current  facility-administered medications for this visit.        Active Problems:  Bandar Loving is a 60 y.o. male with the following Problems and Medications.  Patient Active Problem List   Diagnosis    Allergic asthma ([facility])    Allergic rhinitis due to cats    Allergic rhinitis due to pollen    Benign essential hypertension    Erectile dysfunction    GERD (gastroesophageal reflux disease)    Hiatal hernia    Hyperlipidemia    Labral tear of shoulder    Macular degeneration    Adhesive capsulitis of right shoulder    Rotator cuff tear arthropathy of right shoulder    Sensorineural hearing loss, bilateral    Thyroiditis, subacute    Overweight with body mass index (BMI) of 29 to 29.9 in adult    Central serous chorioretinopathy of right eye    Agatston coronary artery calcium score between 100 and 400    Hemangioma of skin and subcutaneous tissue    Dermatophytosis    Osteoarthritis of knee    B12 deficiency     Current Outpatient Medications   Medication Sig Dispense Refill    Y-J-W-zinc-sod selenate-copper 5,000-60-30 unit-mg-unit tablet Take 1 tablet by mouth once daily.      aspirin 81 mg EC tablet Take 1 tablet (81 mg) by mouth once daily.      atovaquone-proguaniL (Malarone) 250-100 mg tablet PLEASE SEE ATTACHED FOR DETAILED DIRECTIONS      budesonide-formoteroL (Symbicort) 80-4.5 mcg/actuation inhaler USE 2 INHALATIONS TWICE A DAY (RINSE MOUTH AFTER USE) 30.6 g 3    ketoconazole (NIZOral) 2 % cream Apply topically once daily.      lisinopril 10 mg tablet Take 1 tablet (10 mg) by mouth once daily. 90 tablet 3    montelukast (Singulair) 10 mg tablet Take 1 tablet (10 mg) by mouth once daily at bedtime. 90 tablet 3    rosuvastatin (Crestor) 20 mg tablet Take 1 tablet (20 mg) by mouth once daily. 90 tablet 3    tadalafil (Cialis) 20 mg tablet Take 1 tablet (20 mg) by mouth once daily as needed for erectile dysfunction. goodrx 30 tablet 5    triamcinolone (Kenalog) 0.1 % cream APPLY TOPICALLY TWICE A DAY AS NEEDED FOR  RASH OR IRRITATION 80 g 1     No current facility-administered medications for this visit.       PMH:  Past Medical History:   Diagnosis Date    Acute prostatitis 06/29/2015    Acute bacterial prostatitis    Disorder of pigmentation, unspecified 06/04/2019    Pigmented skin lesion of uncertain nature    Epigastric pain 05/02/2019    Dyspepsia    History of colonoscopy 03/24/2022    3/24/22 polyps    Hyperthyroidism 08/05/2023    Hypothyroidism (acquired) 08/05/2023    Pain in right shoulder 05/27/2022    Right shoulder pain    Personal history of other diseases of the digestive system 03/25/2019    History of hepatomegaly    Personal history of other diseases of the digestive system 05/02/2019    History of melena    Personal history of other diseases of the respiratory system     History of asthma    Personal history of other infectious and parasitic diseases 11/30/2021    History of dermatophytosis    Personal history of other specified conditions 04/13/2022    History of chest pain    Personal history of other specified conditions 02/15/2022    History of abdominal pain    Solitary pulmonary nodule 02/23/2021    Lung nodule    Unspecified asthma, uncomplicated ([facility]) 03/18/2016    Asthmatic bronchitis       PSH:  Past Surgical History:   Procedure Laterality Date    MR HEAD ANGIO WO IV CONTRAST  3/4/2015    MR HEAD ANGIO WO IV CONTRAST 3/4/2015 [facility] ANCILLARY LEGACY    MR NECK ANGIO WO IV CONTRAST  3/4/2015    MR NECK ANGIO WO IV CONTRAST 3/4/2015 [facility] ANCILLARY LEGACY    OTHER SURGICAL HISTORY  03/25/2019    Colonoscopy    TONSILLECTOMY  09/29/2014    Tonsillectomy       FMH:  Family History   Problem Relation Name Age of Onset    Alzheimer's disease Mother      Heart disease Father      Heart failure Father      Heart attack Maternal Grandfather      Stroke Paternal Grandfather         SHx:  Social History     Tobacco Use    Smoking status: Never     Passive exposure: Never    Smokeless tobacco: Never   Vaping  Use    Vaping status: Never Used   Substance Use Topics    Alcohol use: Yes    Drug use: Never       Allergies:  No Known Allergies      Assessment/Plan  Doing well from urinary standpoint. Erections working well on tadalafil 20mg prn. Rx refilled.     PSA is low, reassuring.     Fu in 1 year over TH    Scribe Attestation  By signing my name below, I, Vinh Susie, Scribe, attest that this documentation has been prepared under the direction and in the presence of Khalif Deshpande MD.

## 2025-02-12 ENCOUNTER — OFFICE VISIT (OUTPATIENT)
Dept: UROLOGY | Facility: HOSPITAL | Age: 60
End: 2025-02-12
Payer: COMMERCIAL

## 2025-02-12 DIAGNOSIS — N52.9 ERECTILE DYSFUNCTION, UNSPECIFIED ERECTILE DYSFUNCTION TYPE: Primary | ICD-10-CM

## 2025-02-12 PROCEDURE — 99214 OFFICE O/P EST MOD 30 MIN: CPT | Performed by: UROLOGY

## 2025-02-12 PROCEDURE — G2211 COMPLEX E/M VISIT ADD ON: HCPCS | Performed by: UROLOGY

## 2025-02-12 RX ORDER — TADALAFIL 20 MG/1
20 TABLET ORAL DAILY PRN
Qty: 30 TABLET | Refills: 5 | Status: SHIPPED | OUTPATIENT
Start: 2025-02-12 | End: 2026-02-12

## 2025-03-04 ENCOUNTER — OFFICE VISIT (OUTPATIENT)
Dept: CARDIOLOGY | Facility: CLINIC | Age: 60
End: 2025-03-04
Payer: COMMERCIAL

## 2025-03-04 VITALS
BODY MASS INDEX: 28.61 KG/M2 | WEIGHT: 178 LBS | OXYGEN SATURATION: 98 % | HEART RATE: 76 BPM | HEIGHT: 66 IN | DIASTOLIC BLOOD PRESSURE: 76 MMHG | SYSTOLIC BLOOD PRESSURE: 151 MMHG

## 2025-03-04 DIAGNOSIS — R93.1 AGATSTON CORONARY ARTERY CALCIUM SCORE BETWEEN 100 AND 400: ICD-10-CM

## 2025-03-04 DIAGNOSIS — E78.5 HYPERLIPIDEMIA, UNSPECIFIED HYPERLIPIDEMIA TYPE: ICD-10-CM

## 2025-03-04 DIAGNOSIS — I10 BENIGN ESSENTIAL HYPERTENSION: Primary | ICD-10-CM

## 2025-03-04 PROCEDURE — 99213 OFFICE O/P EST LOW 20 MIN: CPT | Performed by: STUDENT IN AN ORGANIZED HEALTH CARE EDUCATION/TRAINING PROGRAM

## 2025-03-04 PROCEDURE — 3078F DIAST BP <80 MM HG: CPT | Performed by: STUDENT IN AN ORGANIZED HEALTH CARE EDUCATION/TRAINING PROGRAM

## 2025-03-04 PROCEDURE — 3008F BODY MASS INDEX DOCD: CPT | Performed by: STUDENT IN AN ORGANIZED HEALTH CARE EDUCATION/TRAINING PROGRAM

## 2025-03-04 PROCEDURE — 1036F TOBACCO NON-USER: CPT | Performed by: STUDENT IN AN ORGANIZED HEALTH CARE EDUCATION/TRAINING PROGRAM

## 2025-03-04 PROCEDURE — 93005 ELECTROCARDIOGRAM TRACING: CPT | Performed by: STUDENT IN AN ORGANIZED HEALTH CARE EDUCATION/TRAINING PROGRAM

## 2025-03-04 PROCEDURE — 3077F SYST BP >= 140 MM HG: CPT | Performed by: STUDENT IN AN ORGANIZED HEALTH CARE EDUCATION/TRAINING PROGRAM

## 2025-03-04 NOTE — PROGRESS NOTES
"Chief Complaint:   Follow-up     History Of Present Illness:    Bandar Loving is a 60 y.o. male returns regular appointment.  Doing well since last point.  Denies chest pain or shortness of breath.  Remains fairly active.  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  Compliant with current medical therapy.  No recent hospitalizations.  Most recent left.  He had a recent calcium score which came  219.      Last Recorded Vitals:  Vitals:    03/04/25 0936   BP: 151/76   BP Location: Left arm   Patient Position: Sitting   Pulse: 76   SpO2: 98%   Weight: 80.7 kg (178 lb)   Height: 1.676 m (5' 6\")       Review of Systems  ROS      Allergies:  Patient has no known allergies.    Outpatient Medications:  Current Outpatient Medications   Medication Instructions    V-Y-F-zinc-sod selenate-copper 5,000-60-30 unit-mg-unit tablet 1 tablet, Daily RT    aspirin 81 mg, Daily    atovaquone-proguaniL (Malarone) 250-100 mg tablet PLEASE SEE ATTACHED FOR DETAILED DIRECTIONS    budesonide-formoteroL (Symbicort) 80-4.5 mcg/actuation inhaler 2 puffs, inhalation, 2 times daily, RINSE MOUTH AFTER USE    ketoconazole (NIZOral) 2 % cream Daily    lisinopril 10 mg, oral, Daily    montelukast (SINGULAIR) 10 mg, oral, Nightly    rosuvastatin (CRESTOR) 20 mg, oral, Daily    tadalafil (CIALIS) 20 mg, oral, Daily PRN, goodrx    triamcinolone (Kenalog) 0.1 % cream APPLY TOPICALLY TWICE A DAY AS NEEDED FOR RASH OR IRRITATION       Physical Exam:  General: No acute distress,  A&O x3  Skin: Warm and dry  Neck: JVD is not elevated  ENT: Moist mucous membranes no lesions appreciated  Pulmonary: CTAB  Cards: Regular rate rhythm, no murmurs gallops or rubs appreciated normal S1-S2  Abdomen: Soft nontender nondistended  Extremities: No edema or cyanosis  Psych: Appropriate mood and affect     Last Labs:  CBC -  Lab Results   Component Value Date    WBC 4.8 08/08/2023    HGB 15.7 08/08/2023    HCT 48.1 08/08/2023    MCV 95 08/08/2023     08/08/2023 " "      CMP -  Lab Results   Component Value Date    CALCIUM 9.9 02/27/2024    PROT 7.6 02/27/2024    ALBUMIN 4.9 02/27/2024    AST 22 02/27/2024    ALT 27 02/27/2024    ALKPHOS 44 02/27/2024    BILITOT 0.8 02/27/2024       LIPID PANEL -   Lab Results   Component Value Date    CHOL 188 02/27/2024    TRIG 79 02/27/2024    HDL 60.3 02/27/2024    CHHDL 3.1 02/27/2024    LDLF 146 (H) 08/08/2023    VLDL 16 02/27/2024    NHDL 128 02/27/2024       RENAL FUNCTION PANEL -   Lab Results   Component Value Date    GLUCOSE 87 02/27/2024     02/27/2024    K 4.5 02/27/2024     02/27/2024    CO2 29 02/27/2024    ANIONGAP 14 02/27/2024    BUN 16 02/27/2024    CREATININE 1.29 02/27/2024    GFRMALE 67 08/08/2023    CALCIUM 9.9 02/27/2024    ALBUMIN 4.9 02/27/2024        Lab Results   Component Value Date    HGBA1C 4.8 08/01/2024       Last Cardiology Tests:  ECG:  No results found for this or any previous visit (from the past 4464 hours).     Echo:  No results found for this or any previous visit from the past 1095 days.       Ejection Fractions:  No results found for: \"EF\"    Assessment and Plan    1.  Subclinical CAD: Calcium score 163 in 2019.  Repeat of 215 from 2024.  Asymptomatic.  Baseline EKG of normal sinus rhythm.  Previous .  Prior functional evaluations in 2020 and 2022 appropriate functional capacity no evidence of ischemia by EKG echo or perfusion.  Patient has a repeat calcium score pending.  Agree with current medication management.  Continue to emphasize heart healthy plant-based diet, exercise and stress reduction.     2.  Hypertension: Blood pressures are fairly controlled current dose of lisinopril.     3.  Hyperlipidemia: Most recent LDL of 93.    Patient may return in 1 year for follow-up     (This note was generated with voice recognition software and may contain errors including spelling, grammar, syntax and missed recognition of what was dictated, of which may not have been fully corrected)    "     Candido King MD PhD

## 2025-03-09 LAB
ATRIAL RATE: 69 BPM
P AXIS: 64 DEGREES
P OFFSET: 200 MS
P ONSET: 142 MS
PR INTERVAL: 166 MS
Q ONSET: 225 MS
QRS COUNT: 12 BEATS
QRS DURATION: 86 MS
QT INTERVAL: 402 MS
QTC CALCULATION(BAZETT): 430 MS
QTC FREDERICIA: 421 MS
R AXIS: 38 DEGREES
T AXIS: 48 DEGREES
T OFFSET: 426 MS
VENTRICULAR RATE: 69 BPM

## 2025-07-30 LAB
ANION GAP SERPL CALCULATED.4IONS-SCNC: 9 MMOL/L (CALC) (ref 7–17)
BUN SERPL-MCNC: 17 MG/DL (ref 7–25)
BUN/CREAT SERPL: NORMAL (CALC) (ref 6–22)
CALCIUM SERPL-MCNC: 9.6 MG/DL (ref 8.6–10.3)
CHLORIDE SERPL-SCNC: 101 MMOL/L (ref 98–110)
CHOLEST SERPL-MCNC: 173 MG/DL
CHOLEST/HDLC SERPL: 2.2 (CALC)
CO2 SERPL-SCNC: 27 MMOL/L (ref 20–32)
CREAT SERPL-MCNC: 1.15 MG/DL (ref 0.7–1.35)
EGFRCR SERPLBLD CKD-EPI 2021: 72 ML/MIN/1.73M2
GLUCOSE SERPL-MCNC: 99 MG/DL (ref 65–99)
HDLC SERPL-MCNC: 77 MG/DL
LDLC SERPL CALC-MCNC: 82 MG/DL (CALC)
NONHDLC SERPL-MCNC: 96 MG/DL (CALC)
POTASSIUM SERPL-SCNC: 4.5 MMOL/L (ref 3.5–5.3)
SODIUM SERPL-SCNC: 137 MMOL/L (ref 135–146)
TRIGL SERPL-MCNC: 68 MG/DL
VIT B12 SERPL-MCNC: 816 PG/ML (ref 200–1100)

## 2025-08-04 DIAGNOSIS — R21 RASH: ICD-10-CM

## 2025-08-05 DIAGNOSIS — J45.909 EXTRINSIC ASTHMA WITHOUT COMPLICATION, UNSPECIFIED ASTHMA SEVERITY, UNSPECIFIED WHETHER PERSISTENT (HHS-HCC): ICD-10-CM

## 2025-08-05 DIAGNOSIS — J30.81 ALLERGIC RHINITIS DUE TO CATS: ICD-10-CM

## 2025-08-05 DIAGNOSIS — R21 RASH: ICD-10-CM

## 2025-08-05 RX ORDER — TRIAMCINOLONE ACETONIDE 1 MG/G
CREAM TOPICAL
Qty: 80 G | Refills: 8 | OUTPATIENT
Start: 2025-08-05

## 2025-08-05 RX ORDER — BUDESONIDE AND FORMOTEROL FUMARATE DIHYDRATE 80; 4.5 UG/1; UG/1
2 AEROSOL RESPIRATORY (INHALATION) 2 TIMES DAILY
Qty: 30.6 G | Refills: 3 | OUTPATIENT
Start: 2025-08-05

## 2025-08-05 RX ORDER — BUDESONIDE AND FORMOTEROL FUMARATE DIHYDRATE 80; 4.5 UG/1; UG/1
2 AEROSOL RESPIRATORY (INHALATION) 2 TIMES DAILY
Qty: 30.6 G | Refills: 0 | Status: SHIPPED | OUTPATIENT
Start: 2025-08-05 | End: 2025-11-03

## 2025-08-05 RX ORDER — MONTELUKAST SODIUM 10 MG/1
10 TABLET ORAL NIGHTLY
Qty: 90 TABLET | Refills: 0 | Status: SHIPPED | OUTPATIENT
Start: 2025-08-05 | End: 2025-11-03

## 2025-08-25 ENCOUNTER — APPOINTMENT (OUTPATIENT)
Dept: PRIMARY CARE | Facility: CLINIC | Age: 60
End: 2025-08-25
Payer: COMMERCIAL

## 2025-08-25 VITALS
SYSTOLIC BLOOD PRESSURE: 124 MMHG | OXYGEN SATURATION: 97 % | WEIGHT: 180.6 LBS | HEART RATE: 77 BPM | RESPIRATION RATE: 14 BRPM | TEMPERATURE: 97 F | BODY MASS INDEX: 29.15 KG/M2 | DIASTOLIC BLOOD PRESSURE: 77 MMHG

## 2025-08-25 DIAGNOSIS — E78.5 HYPERLIPIDEMIA, UNSPECIFIED HYPERLIPIDEMIA TYPE: Primary | ICD-10-CM

## 2025-08-25 DIAGNOSIS — R93.1 AGATSTON CORONARY ARTERY CALCIUM SCORE BETWEEN 100 AND 400: ICD-10-CM

## 2025-08-25 DIAGNOSIS — E66.3 OVERWEIGHT WITH BODY MASS INDEX (BMI) OF 29 TO 29.9 IN ADULT: ICD-10-CM

## 2025-08-25 DIAGNOSIS — R21 RASH: ICD-10-CM

## 2025-08-25 DIAGNOSIS — Z12.5 SCREENING FOR MALIGNANT NEOPLASM OF PROSTATE: ICD-10-CM

## 2025-08-25 DIAGNOSIS — I10 BENIGN ESSENTIAL HYPERTENSION: ICD-10-CM

## 2025-08-25 DIAGNOSIS — J45.20 MILD INTERMITTENT EXTRINSIC ASTHMA WITHOUT COMPLICATION (HHS-HCC): ICD-10-CM

## 2025-08-25 DIAGNOSIS — E06.1 THYROIDITIS, SUBACUTE: ICD-10-CM

## 2025-08-25 PROCEDURE — 99214 OFFICE O/P EST MOD 30 MIN: CPT | Performed by: FAMILY MEDICINE

## 2025-08-25 PROCEDURE — 3074F SYST BP LT 130 MM HG: CPT | Performed by: FAMILY MEDICINE

## 2025-08-25 PROCEDURE — 1036F TOBACCO NON-USER: CPT | Performed by: FAMILY MEDICINE

## 2025-08-25 PROCEDURE — 3078F DIAST BP <80 MM HG: CPT | Performed by: FAMILY MEDICINE

## 2025-08-25 RX ORDER — KETOCONAZOLE 20 MG/G
CREAM TOPICAL DAILY
Qty: 60 G | Refills: 0 | Status: SHIPPED | OUTPATIENT
Start: 2025-08-25

## 2025-08-25 RX ORDER — TRIAMCINOLONE ACETONIDE 1 MG/G
CREAM TOPICAL DAILY
Qty: 80 G | Refills: 0 | Status: SHIPPED | OUTPATIENT
Start: 2025-08-25

## 2025-08-25 ASSESSMENT — ENCOUNTER SYMPTOMS
COUGH: 0
CHILLS: 0
SHORTNESS OF BREATH: 0
FEVER: 0
PALPITATIONS: 0
FATIGUE: 0

## 2025-11-13 ENCOUNTER — APPOINTMENT (OUTPATIENT)
Dept: ALLERGY | Facility: CLINIC | Age: 60
End: 2025-11-13
Payer: COMMERCIAL

## 2026-02-03 ENCOUNTER — APPOINTMENT (OUTPATIENT)
Dept: PRIMARY CARE | Facility: CLINIC | Age: 61
End: 2026-02-03
Payer: COMMERCIAL